# Patient Record
Sex: MALE | Race: BLACK OR AFRICAN AMERICAN | NOT HISPANIC OR LATINO | Employment: FULL TIME | URBAN - METROPOLITAN AREA
[De-identification: names, ages, dates, MRNs, and addresses within clinical notes are randomized per-mention and may not be internally consistent; named-entity substitution may affect disease eponyms.]

---

## 2019-06-24 ENCOUNTER — OFFICE VISIT (OUTPATIENT)
Dept: MEDICAL GROUP | Facility: LAB | Age: 49
End: 2019-06-24
Payer: COMMERCIAL

## 2019-06-24 VITALS
DIASTOLIC BLOOD PRESSURE: 80 MMHG | OXYGEN SATURATION: 95 % | TEMPERATURE: 97.8 F | HEIGHT: 73 IN | BODY MASS INDEX: 30.62 KG/M2 | HEART RATE: 58 BPM | SYSTOLIC BLOOD PRESSURE: 130 MMHG | RESPIRATION RATE: 12 BRPM | WEIGHT: 231 LBS

## 2019-06-24 DIAGNOSIS — M75.101 TEAR OF RIGHT ROTATOR CUFF, UNSPECIFIED TEAR EXTENT: ICD-10-CM

## 2019-06-24 DIAGNOSIS — J45.20 MILD INTERMITTENT ASTHMA WITHOUT COMPLICATION: ICD-10-CM

## 2019-06-24 DIAGNOSIS — Z00.00 PREVENTATIVE HEALTH CARE: ICD-10-CM

## 2019-06-24 DIAGNOSIS — I10 ESSENTIAL HYPERTENSION: ICD-10-CM

## 2019-06-24 DIAGNOSIS — E83.19 IRON OVERLOAD: ICD-10-CM

## 2019-06-24 PROCEDURE — 99204 OFFICE O/P NEW MOD 45 MIN: CPT | Performed by: NURSE PRACTITIONER

## 2019-06-24 RX ORDER — TRIAMTERENE AND HYDROCHLOROTHIAZIDE 37.5; 25 MG/1; MG/1
1 CAPSULE ORAL EVERY MORNING
Qty: 90 CAP | Refills: 3 | Status: SHIPPED | OUTPATIENT
Start: 2019-06-24 | End: 2020-07-06 | Stop reason: SDUPTHER

## 2019-06-24 RX ORDER — TRIAMTERENE AND HYDROCHLOROTHIAZIDE 37.5; 25 MG/1; MG/1
1 CAPSULE ORAL EVERY MORNING
COMMUNITY
End: 2019-06-24 | Stop reason: SDUPTHER

## 2019-06-24 RX ORDER — LISINOPRIL 20 MG/1
20 TABLET ORAL DAILY
Qty: 90 TAB | Refills: 3 | Status: SHIPPED | OUTPATIENT
Start: 2019-06-24 | End: 2020-07-06 | Stop reason: SDUPTHER

## 2019-06-24 RX ORDER — LISINOPRIL 20 MG/1
20 TABLET ORAL DAILY
COMMUNITY
End: 2019-06-24 | Stop reason: SDUPTHER

## 2019-06-24 RX ORDER — ALBUTEROL SULFATE 90 UG/1
2 AEROSOL, METERED RESPIRATORY (INHALATION) EVERY 6 HOURS PRN
COMMUNITY
End: 2019-10-29 | Stop reason: SDUPTHER

## 2019-06-24 ASSESSMENT — PATIENT HEALTH QUESTIONNAIRE - PHQ9: CLINICAL INTERPRETATION OF PHQ2 SCORE: 0

## 2019-06-24 NOTE — ASSESSMENT & PLAN NOTE
Chronic issue for the patient, new to me today.  Not checking BP at home.  Has not taken his meds this morning.  No CP, leg swelling or headaches.  UTD with eye doctor and retinal exam was fine.

## 2019-06-24 NOTE — PROGRESS NOTES
"CC  New pt to [facility]    HPI  \"Trell,\" is a 48 yo male here to establish care.  Moved here from [place] less than a year ago.  Working at [employer].  Single with 2 children.  Non-smoker.  Drinks excessively, about 12 beers per day.  See medication list below.  Past medical history of hypertension, asthma and hemochromatosis.    #1- Essential hypertension  Chronic issue for the patient, new to me today.  Typically very well controlled on triamterene/HCTZ and lisinopril.  Not checking BP at home.  Has not taken his meds this morning.  No CP, leg swelling or headaches.  UTD with eye doctor and retinal exam was fine.  He does tell me that he had a stress test and EKG done 12 years ago because of some heart palpitations and chest pain, both came back normal.  He has a very strong family history of hypertension and unfortunately a few of his siblings have already had a stroke prior to the age of 55.    #2-Mild intermittent asthma without complication  Chronic issue for pt, new to me today.  Rare use of albuterol.  Tells me that his asthma is typically very well to control.  He does not believe in getting flu shots or pneumonia vaccines, stating that they make everyone sick.    #3-Iron overload  Chronic issue for pt, new to me today.  Due for labs.  Trying to find a blood donation site.  Has never had a blood clot.    #4-torn rotator cuff -   Dx 2 yrs ago in [place] via MRI and hurting frequently.  Would like a referral to Orthopedics for repair.        HCM:   last blood work 1.5 yrs ago.  Had colonoscopy 2009  Stress test normal 12 yrs ago.     Family History   Problem Relation Age of Onset   • Cancer Mother         liver   • Diabetes Mother    • Hypertension Mother    • Hypertension Father    • Stroke Sister         49 - one sister   • Stroke Brother         51 - one brother   • Heart Disease Maternal Grandmother         MI     Past Surgical History:   Procedure Laterality Date   • ANKLE ARTHROSCOPY      right - age 42 " "      Current Outpatient Prescriptions:   •  albuterol, 2 Puff, Inhalation, Q6HRS PRN, Taking  •  triamterene/hctz, 1 Cap, Oral, QAM  •  lisinopril, 20 mg, Oral, DAILY    Review Of Systems  Denies fever, chills, or sweats, unexplained weight changes   Skin: negative for rash, changing moles, abnormal pigmentation, hair or nail changes.  Eyes: negative for visual blurring, double vision, eye pain, floaters and discharge from eyes  Ears/Nose/Throat: negative for tinnitus, vertigo, oral or dental problem, hoarseness, frequent URI's, sinus trouble, persistent sore throat.  Respiratory: negative for persistent cough, hemoptysis, dyspnea, wheezing  Cardiovascular: negative for palpitations, tachycardia, irregular heart beat, chest pain or pressure or peripheral edema.   Gastrointestinal: negative for dysphagia or odynophagia, nausea, heartburn or reflux, abdominal pain, hemorrhoids, constipation or diarrhea, black stool or bloody stool  Genitourinary: negative for dysuria, frequency, incontinence  Musculoskeletal: + for right shoulder pain  Neurologic: negative for new or changing headaches, new weakness tremor  Psychiatric: negative for mood disturbance, anxiety, depression, sexual difficulties  Hematologic/Lymphatic/Immunologic: negative for pallor, unusual bruising, swollen glands, HIV risk factors  Endocrine: negative for temperature intolerance, polydipsia, polyuria.    Exam:  /80 (BP Location: Left arm, Patient Position: Sitting, BP Cuff Size: Large adult)   Pulse (!) 58   Temp 36.6 °C (97.8 °F)   Resp 12   Ht 1.854 m (6' 1\")   Wt 104.8 kg (231 lb)   SpO2 95%   Constitutional: Alert, no distress, plus 3 vital signs  Skin:  Warm, dry, no rashes invisible areas  Eye: Equal, round and reactive, conjunctiva clear  ENMT: Lips without lesions, good dentition, oropharynx clear    Neck: Trachea midline, no masses, no thyromegaly  Respiratory: Unlabored respiration, lungs clear to auscultation, no wheezes, no " rhonchi  Cardiovascular: Normal rate and rhythm, no murmur, no edema  Abdomen: Soft, nontender, no masses or hepatosplenomegaly  Psych: Alert, pleasant, well-groomed, normal affect    Assessment / Plan / Medical Decision makin. Essential hypertension  -Stable today even without the patient taking his medication this morning.  Refilled both medications.  Encouraged him to cut back on his alcohol intake and to work on his weight, preferably 10 to 15 pound weight loss over the next few months with a goal of getting around 200 pounds.  Encouraged a low-sodium diet and exercise.  Non-smoker.  Recommend updated lab work.  Follow-up 6 months.  - triamterene/hctz (MAXZIDE-25/DYAZIDE) 37.5-25 MG Cap; Take 1 Cap by mouth every morning.  Dispense: 90 Cap; Refill: 3  - lisinopril (PRINIVIL) 20 MG Tab; Take 1 Tab by mouth every day.  Dispense: 90 Tab; Refill: 3    2. Mild intermittent asthma without complication  -Stable.  Continue as needed use of albuterol.    3. Iron overload  -Recommend updated CBC.  Discussed ways in which he can donate blood in town.    4. Preventative health care  -Recommend updated lab work.  - Comp Metabolic Panel; Future  - CBC WITH DIFFERENTIAL; Future  - Lipid Profile; Future  - HEMOGLOBIN A1C; Future    5.  Torn rotator cuff:   Referred to ortho.  Requested MRI from Liam provider.

## 2019-06-24 NOTE — LETTER
[facility]  Jihan Kay AYakovP.N.  [id number] [address]  [place] [address]  Fax: [phone]   Authorization for Release/Disclosure of   Protected Health Information   Name: JUNIOR ADAMES : 1970 SSN: [id number]   Address: [address] [place] [address]  [place] [address] Phone:    [phone] (home)    I authorize the entity listed below to release/disclose the PHI below to:   [facility]/Jihan Kay A.P.N. and LUIS FERNANDO Helm.SHANTAL.   Provider or Entity Name:     Address   City, State, [place], [place] Phone:      Fax:     Reason for request: continuity of care   Information to be released:    [  ] LAST COLONOSCOPY,  including any PATH REPORT and follow-up  [  ] LAST FIT/COLOGUARD RESULT [  ] LAST DEXA  [  ] LAST MAMMOGRAM  [  ] LAST PAP  [  ] LAST LABS [  ] RETINA EXAM REPORT  [  ] IMMUNIZATION RECORDS  [ x ] Release all info  Particularly MRI of shoulder.       [  ] Check here and initial the line next to each item to release ALL health information INCLUDING  _____ Care and treatment for drug and / or alcohol abuse  _____ HIV testing, infection status, or AIDS  _____ Genetic Testing    DATES OF SERVICE OR TIME PERIOD TO BE DISCLOSED: _____________  I understand and acknowledge that:  * This Authorization may be revoked at any time by you in writing, except if your health information has already been used or disclosed.  * Your health information that will be used or disclosed as a result of you signing this authorization could be re-disclosed by the recipient. If this occurs, your re-disclosed health information may no longer be protected by State or Federal laws.  * You may refuse to sign this Authorization. Your refusal will not affect your ability to obtain treatment.  * This Authorization becomes effective upon signing and will  on (date) __________.      If no date is indicated, this Authorization will  one (1) year from the signature date.    Name:   Bryson    Signature:   Date:     6/24/2019       PLEASE FAX REQUESTED RECORDS BACK TO: [phone]

## 2019-07-02 LAB
ALBUMIN SERPL-MCNC: 4.7 G/DL (ref 3.5–5.5)
ALBUMIN/GLOB SERPL: 1.7 {RATIO} (ref 1.2–2.2)
ALP SERPL-CCNC: 57 IU/L (ref 39–117)
ALT SERPL-CCNC: 60 IU/L (ref 0–44)
AMBIG ABBREV CMP14 DFLT   977206: NORMAL
AMBIG ABBREV LP  977212: NORMAL
AST SERPL-CCNC: 30 IU/L (ref 0–40)
BASOPHILS # BLD AUTO: 0 X10E3/UL (ref 0–0.2)
BASOPHILS NFR BLD AUTO: 1 %
BILIRUB SERPL-MCNC: 0.5 MG/DL (ref 0–1.2)
BUN SERPL-MCNC: 20 MG/DL (ref 6–24)
BUN/CREAT SERPL: 18 (ref 9–20)
CALCIUM SERPL-MCNC: 10.2 MG/DL (ref 8.7–10.2)
CHLORIDE SERPL-SCNC: 101 MMOL/L (ref 96–106)
CHOLEST SERPL-MCNC: 177 MG/DL (ref 100–199)
CO2 SERPL-SCNC: 23 MMOL/L (ref 20–29)
CREAT SERPL-MCNC: 1.14 MG/DL (ref 0.76–1.27)
EOSINOPHIL # BLD AUTO: 0.2 X10E3/UL (ref 0–0.4)
EOSINOPHIL NFR BLD AUTO: 3 %
ERYTHROCYTE [DISTWIDTH] IN BLOOD BY AUTOMATED COUNT: 15.3 % (ref 12.3–15.4)
GLOBULIN SER CALC-MCNC: 2.7 G/DL (ref 1.5–4.5)
GLUCOSE SERPL-MCNC: 119 MG/DL (ref 65–99)
HBA1C MFR BLD: 6.2 % (ref 4.8–5.6)
HCT VFR BLD AUTO: 50.2 % (ref 37.5–51)
HDLC SERPL-MCNC: 57 MG/DL
HGB BLD-MCNC: 16.6 G/DL (ref 13–17.7)
IMM GRANULOCYTES # BLD AUTO: 0 X10E3/UL (ref 0–0.1)
IMM GRANULOCYTES NFR BLD AUTO: 0 %
IMMATURE CELLS  115398: ABNORMAL
LABORATORY COMMENT REPORT: ABNORMAL
LDLC SERPL CALC-MCNC: 104 MG/DL (ref 0–99)
LYMPHOCYTES # BLD AUTO: 3.6 X10E3/UL (ref 0.7–3.1)
LYMPHOCYTES NFR BLD AUTO: 62 %
MCH RBC QN AUTO: 27.2 PG (ref 26.6–33)
MCHC RBC AUTO-ENTMCNC: 33.1 G/DL (ref 31.5–35.7)
MCV RBC AUTO: 82 FL (ref 79–97)
MONOCYTES # BLD AUTO: 0.5 X10E3/UL (ref 0.1–0.9)
MONOCYTES NFR BLD AUTO: 8 %
MORPHOLOGY BLD-IMP: ABNORMAL
NEUTROPHILS # BLD AUTO: 1.6 X10E3/UL (ref 1.4–7)
NEUTROPHILS NFR BLD AUTO: 26 %
NRBC BLD AUTO-RTO: ABNORMAL %
POTASSIUM SERPL-SCNC: 4.6 MMOL/L (ref 3.5–5.2)
PROT SERPL-MCNC: 7.4 G/DL (ref 6–8.5)
RBC # BLD AUTO: 6.11 X10E6/UL (ref 4.14–5.8)
SODIUM SERPL-SCNC: 139 MMOL/L (ref 134–144)
TRIGL SERPL-MCNC: 80 MG/DL (ref 0–149)
VLDLC SERPL CALC-MCNC: 16 MG/DL (ref 5–40)
WBC # BLD AUTO: 5.9 X10E3/UL (ref 3.4–10.8)

## 2019-10-29 RX ORDER — ALBUTEROL SULFATE 90 UG/1
2 AEROSOL, METERED RESPIRATORY (INHALATION) EVERY 6 HOURS PRN
Qty: 8.5 G | Refills: 3 | Status: SHIPPED | OUTPATIENT
Start: 2019-10-29 | End: 2020-07-06 | Stop reason: SDUPTHER

## 2019-10-29 NOTE — TELEPHONE ENCOUNTER
Was the patient seen in the last year in this department? Yes  6/24/19  Does patient have an active prescription for medications requested? No     Received Request Via: Patient

## 2019-11-15 ENCOUNTER — OFFICE VISIT (OUTPATIENT)
Dept: URGENT CARE | Facility: CLINIC | Age: 49
End: 2019-11-15

## 2019-11-15 VITALS
HEART RATE: 82 BPM | DIASTOLIC BLOOD PRESSURE: 70 MMHG | WEIGHT: 235 LBS | SYSTOLIC BLOOD PRESSURE: 126 MMHG | TEMPERATURE: 98.4 F | BODY MASS INDEX: 31.14 KG/M2 | HEIGHT: 73 IN

## 2019-11-15 DIAGNOSIS — Z02.89 ENCOUNTER FOR OCCUPATIONAL HEALTH ASSESSMENT: ICD-10-CM

## 2019-11-15 PROCEDURE — 94010 BREATHING CAPACITY TEST: CPT | Performed by: INTERNAL MEDICINE

## 2019-11-15 PROCEDURE — 8916 PR CLEARANCE ONLY: Performed by: INTERNAL MEDICINE

## 2019-11-15 PROCEDURE — 92552 PURE TONE AUDIOMETRY AIR: CPT | Performed by: INTERNAL MEDICINE

## 2019-11-15 NOTE — PROGRESS NOTES
"Subjective:   Junior Massey is a 49 y.o. male who presents for Medical Clearance (respiratory clearance / audio / sprio)      see form   HPI  ROS  Allergies   Allergen Reactions   • Asa [Aspirin]       Objective:   /70   Pulse 82   Temp 36.9 °C (98.4 °F) (Temporal)   Ht 1.854 m (6' 1\")   Wt 106.6 kg (235 lb)   BMI 31.00 kg/m²   Physical Exam    see form  Assessment/Plan:   1. Encounter for occupational health assessment      Differential diagnosis, natural history, supportive care, and indications for immediate follow-up discussed.       "

## 2019-12-23 ENCOUNTER — OFFICE VISIT (OUTPATIENT)
Dept: MEDICAL GROUP | Facility: LAB | Age: 49
End: 2019-12-23
Payer: COMMERCIAL

## 2019-12-23 VITALS
RESPIRATION RATE: 14 BRPM | DIASTOLIC BLOOD PRESSURE: 84 MMHG | SYSTOLIC BLOOD PRESSURE: 136 MMHG | HEIGHT: 73 IN | BODY MASS INDEX: 30.88 KG/M2 | HEART RATE: 80 BPM | OXYGEN SATURATION: 95 % | TEMPERATURE: 98.8 F | WEIGHT: 233 LBS

## 2019-12-23 DIAGNOSIS — Z00.00 PREVENTATIVE HEALTH CARE: ICD-10-CM

## 2019-12-23 DIAGNOSIS — K21.9 GASTROESOPHAGEAL REFLUX DISEASE WITHOUT ESOPHAGITIS: ICD-10-CM

## 2019-12-23 DIAGNOSIS — I10 ESSENTIAL HYPERTENSION: ICD-10-CM

## 2019-12-23 DIAGNOSIS — N50.89 TESTICULAR MASS: ICD-10-CM

## 2019-12-23 PROCEDURE — 99214 OFFICE O/P EST MOD 30 MIN: CPT | Performed by: NURSE PRACTITIONER

## 2019-12-23 RX ORDER — OMEPRAZOLE 20 MG/1
20 CAPSULE, DELAYED RELEASE ORAL DAILY
Qty: 90 CAP | Refills: 3 | Status: SHIPPED | OUTPATIENT
Start: 2019-12-23

## 2019-12-23 NOTE — PROGRESS NOTES
"Chief Complaint   Patient presents with   • Hypertension     follow up        HPI   Trell is a 49-year-old established male here to follow-up on chronic issues.  #1- HTN: chronic issue.  Not checking home BP.  Denies CP, HA, or leg swelling.  Taking lisinopril 20 mg and triamterene / hctz 37.5/25 daily.  Weight is stable.  Admits that he has been eating a little bit too much and drinking more than he should.    #2- gerd : chronic issue.  Omeprazole works great for him and he is requesting a refill.  Heart burn flares with grease / spicy foods.  Denies black or bloody stools.  Denies chronic abdominal pain.  Has periodic diarrhea when he eats spicy stuff, such as yesterday and he has had diarrhea a few times overnight that is nonbloody or black.  #3- testicular mass: New issue to me.  Present x 3 years and growing.  Denies pain.  No dysuria / hematuria.  No other associated symptoms.  No history of the same.  No history of urology consults.    Past medical, surgical, family, and social history is reviewed and updated in Epic chart by me today.   Medications and allergies reviewed and updated in Epic chart by me today.     ROS:   As documented in history of present illness above    Exam:  /84 (BP Location: Left arm, Patient Position: Sitting, BP Cuff Size: Large adult)   Pulse 80   Temp 37.1 °C (98.8 °F)   Resp 14   Ht 1.854 m (6' 1\")   Wt 105.7 kg (233 lb)   SpO2 95%   Constitutional: Alert, no distress, plus 3 vital signs  Skin:  Warm, dry, no rashes invisible areas  Eye: Equal, round and reactive, conjunctiva clear  ENMT: Lips without lesions  Respiratory: Unlabored respiration, lungs clear to auscultation, no wheezes, no rhonchi  Cardiovascular: Normal rate and rhythm  :  4-5 mm solid feeling mass to right superior testicle that is non-tender.    Psych: Alert, pleasant, well-groomed, normal affect    Assessment / Plan / Medical Decision makin. Gastroesophageal reflux disease without " esophagitis  -Stable with omeprazole use.  Discussed the importance of avoiding spicy, fried foods and large meals.  Recommend endoscopy with colonoscopy at age 52.  - omeprazole (PRILOSEC) 20 MG delayed-release capsule; Take 1 Cap by mouth every day.  Dispense: 90 Cap; Refill: 3    2. Testicular mass  -Recommend a testicular ultrasound with possible urology consult based on ultrasound results.  - [id number]    3. Essential hypertension  -Borderline stable.  Discussed the importance of exercise, low-sodium diet and monitoring his home blood pressures.  Encouraged him to contact me if his home blood pressures are consistently greater than 140/90 or below 90/60.  Follow-up 6 months after labs.  Continue current medications.    4. Preventative health care  - Comp Metabolic Panel; Future  - CBC WITH DIFFERENTIAL; Future  - Lipid Profile; Future  - HEMOGLOBIN A1C; Future

## 2020-01-06 ENCOUNTER — HOSPITAL ENCOUNTER (OUTPATIENT)
Dept: RADIOLOGY | Facility: MEDICAL CENTER | Age: 50
End: 2020-01-06
Attending: NURSE PRACTITIONER
Payer: COMMERCIAL

## 2020-01-06 PROCEDURE — 76870 US EXAM SCROTUM: CPT

## 2020-01-08 ENCOUNTER — TELEPHONE (OUTPATIENT)
Dept: MEDICAL GROUP | Facility: LAB | Age: 50
End: 2020-01-08

## 2020-01-08 DIAGNOSIS — R93.89 ABNORMAL FINDING ON ULTRASOUND: ICD-10-CM

## 2020-01-08 DIAGNOSIS — I86.1 VARICOCELE PRESENT ON ULTRASOUND OF SCROTUM: ICD-10-CM

## 2020-01-08 NOTE — TELEPHONE ENCOUNTER
"----- Message from Junior Massey sent at 1/8/2020 12:57 PM PST -----  Regarding: RE: Non-Urgent Medical Question  Contact: [phone]  i will like it took be removed because of the insurance coverage i have and now that i know it there it bother me..lol. i\"ll let you know what i setup or need. Thank you Jihan.  ----- Message -----  From: DINA Helm  Sent: 1/8/2020 12:33 PM PST  To: Junior Massey  Subject: RE: Non-Urgent Medical Question  You do not have to do anything about it unless you are in pain.  If you are in pain, I would like for you to see urology and they can let you know options such as if they can surgically drain it/remove it or if they recommend that.  Let me know if he would like a urology referral just to go over the ultrasound with a specialist.  Please email with any questions,  Jihan      ----- Message -----     From: Junior Massey     Sent: 1/7/2020  8:58 PM PST       To: DINA Helm  Subject: RE: Non-Urgent Medical Question    Joe Bobo,   I guess we do nothing,  or do we removed the cyst surgically?  I'll like to talk about options..[phone]..thanks you again.    "

## 2020-03-09 ENCOUNTER — HOSPITAL ENCOUNTER (OUTPATIENT)
Dept: LAB | Facility: MEDICAL CENTER | Age: 50
End: 2020-03-09
Attending: UROLOGY
Payer: COMMERCIAL

## 2020-03-09 PROCEDURE — 84153 ASSAY OF PSA TOTAL: CPT

## 2020-03-09 PROCEDURE — 87086 URINE CULTURE/COLONY COUNT: CPT

## 2020-03-10 LAB — PSA SERPL-MCNC: 0.98 NG/ML (ref 0–4)

## 2020-03-12 LAB
BACTERIA UR CULT: NORMAL
SIGNIFICANT IND 70042: NORMAL
SITE SITE: NORMAL
SOURCE SOURCE: NORMAL

## 2020-07-06 ENCOUNTER — OFFICE VISIT (OUTPATIENT)
Dept: MEDICAL GROUP | Facility: LAB | Age: 50
End: 2020-07-06
Payer: COMMERCIAL

## 2020-07-06 ENCOUNTER — HOSPITAL ENCOUNTER (OUTPATIENT)
Dept: LAB | Facility: MEDICAL CENTER | Age: 50
End: 2020-07-06
Attending: NURSE PRACTITIONER
Payer: COMMERCIAL

## 2020-07-06 VITALS
TEMPERATURE: 98.8 F | DIASTOLIC BLOOD PRESSURE: 78 MMHG | HEIGHT: 73 IN | HEART RATE: 80 BPM | OXYGEN SATURATION: 97 % | WEIGHT: 230 LBS | SYSTOLIC BLOOD PRESSURE: 110 MMHG | BODY MASS INDEX: 30.48 KG/M2 | RESPIRATION RATE: 14 BRPM

## 2020-07-06 DIAGNOSIS — Z00.00 ANNUAL PHYSICAL EXAM: ICD-10-CM

## 2020-07-06 DIAGNOSIS — I10 ESSENTIAL HYPERTENSION: ICD-10-CM

## 2020-07-06 DIAGNOSIS — J45.20 MILD INTERMITTENT ASTHMA WITHOUT COMPLICATION: ICD-10-CM

## 2020-07-06 PROBLEM — I86.1 VARICOCELE: Status: ACTIVE | Noted: 2020-01-28

## 2020-07-06 PROBLEM — N43.3 HYDROCELE OF TESTIS: Status: ACTIVE | Noted: 2020-01-28

## 2020-07-06 PROBLEM — N50.3 CYST OF EPIDIDYMIS: Status: ACTIVE | Noted: 2020-01-28

## 2020-07-06 LAB
ALBUMIN SERPL BCP-MCNC: 4.4 G/DL (ref 3.2–4.9)
ALBUMIN/GLOB SERPL: 1.6 G/DL
ALP SERPL-CCNC: 56 U/L (ref 30–99)
ALT SERPL-CCNC: 65 U/L (ref 2–50)
ANION GAP SERPL CALC-SCNC: 12 MMOL/L (ref 7–16)
AST SERPL-CCNC: 39 U/L (ref 12–45)
BASOPHILS # BLD AUTO: 0.8 % (ref 0–1.8)
BASOPHILS # BLD: 0.04 K/UL (ref 0–0.12)
BILIRUB SERPL-MCNC: 0.3 MG/DL (ref 0.1–1.5)
BUN SERPL-MCNC: 11 MG/DL (ref 8–22)
CALCIUM SERPL-MCNC: 9.4 MG/DL (ref 8.5–10.5)
CHLORIDE SERPL-SCNC: 102 MMOL/L (ref 96–112)
CHOLEST SERPL-MCNC: 163 MG/DL (ref 100–199)
CO2 SERPL-SCNC: 25 MMOL/L (ref 20–33)
CREAT SERPL-MCNC: 0.94 MG/DL (ref 0.5–1.4)
EOSINOPHIL # BLD AUTO: 0.26 K/UL (ref 0–0.51)
EOSINOPHIL NFR BLD: 5.2 % (ref 0–6.9)
ERYTHROCYTE [DISTWIDTH] IN BLOOD BY AUTOMATED COUNT: 40.4 FL (ref 35.9–50)
EST. AVERAGE GLUCOSE BLD GHB EST-MCNC: 131 MG/DL
FASTING STATUS PATIENT QL REPORTED: NORMAL
GLOBULIN SER CALC-MCNC: 2.8 G/DL (ref 1.9–3.5)
GLUCOSE SERPL-MCNC: 95 MG/DL (ref 65–99)
HBA1C MFR BLD: 6.2 % (ref 0–5.6)
HCT VFR BLD AUTO: 46.5 % (ref 42–52)
HDLC SERPL-MCNC: 63 MG/DL
HGB BLD-MCNC: 15.3 G/DL (ref 14–18)
IMM GRANULOCYTES # BLD AUTO: 0.02 K/UL (ref 0–0.11)
IMM GRANULOCYTES NFR BLD AUTO: 0.4 % (ref 0–0.9)
LDLC SERPL CALC-MCNC: 82 MG/DL
LYMPHOCYTES # BLD AUTO: 2.72 K/UL (ref 1–4.8)
LYMPHOCYTES NFR BLD: 54.4 % (ref 22–41)
MCH RBC QN AUTO: 25.4 PG (ref 27–33)
MCHC RBC AUTO-ENTMCNC: 32.9 G/DL (ref 33.7–35.3)
MCV RBC AUTO: 77.2 FL (ref 81.4–97.8)
MONOCYTES # BLD AUTO: 0.54 K/UL (ref 0–0.85)
MONOCYTES NFR BLD AUTO: 10.8 % (ref 0–13.4)
NEUTROPHILS # BLD AUTO: 1.42 K/UL (ref 1.82–7.42)
NEUTROPHILS NFR BLD: 28.4 % (ref 44–72)
NRBC # BLD AUTO: 0 K/UL
NRBC BLD-RTO: 0 /100 WBC
PLATELET # BLD AUTO: 182 K/UL (ref 164–446)
PMV BLD AUTO: 11 FL (ref 9–12.9)
POTASSIUM SERPL-SCNC: 4 MMOL/L (ref 3.6–5.5)
PROT SERPL-MCNC: 7.2 G/DL (ref 6–8.2)
RBC # BLD AUTO: 6.02 M/UL (ref 4.7–6.1)
SODIUM SERPL-SCNC: 139 MMOL/L (ref 135–145)
TRIGL SERPL-MCNC: 88 MG/DL (ref 0–149)
WBC # BLD AUTO: 5 K/UL (ref 4.8–10.8)

## 2020-07-06 PROCEDURE — 99396 PREV VISIT EST AGE 40-64: CPT | Performed by: NURSE PRACTITIONER

## 2020-07-06 PROCEDURE — 80061 LIPID PANEL: CPT

## 2020-07-06 PROCEDURE — 83036 HEMOGLOBIN GLYCOSYLATED A1C: CPT

## 2020-07-06 PROCEDURE — 80053 COMPREHEN METABOLIC PANEL: CPT

## 2020-07-06 PROCEDURE — 36415 COLL VENOUS BLD VENIPUNCTURE: CPT

## 2020-07-06 PROCEDURE — 85025 COMPLETE CBC W/AUTO DIFF WBC: CPT

## 2020-07-06 RX ORDER — LISINOPRIL 20 MG/1
20 TABLET ORAL DAILY
Qty: 90 TAB | Refills: 3 | Status: SHIPPED | OUTPATIENT
Start: 2020-07-06 | End: 2021-07-08

## 2020-07-06 RX ORDER — TRIAMTERENE AND HYDROCHLOROTHIAZIDE 37.5; 25 MG/1; MG/1
1 CAPSULE ORAL EVERY MORNING
Qty: 90 CAP | Refills: 3 | Status: SHIPPED | OUTPATIENT
Start: 2020-07-06 | End: 2021-07-08

## 2020-07-06 RX ORDER — ALBUTEROL SULFATE 90 UG/1
2 AEROSOL, METERED RESPIRATORY (INHALATION) EVERY 6 HOURS PRN
Qty: 8.5 G | Refills: 5 | Status: SHIPPED | OUTPATIENT
Start: 2020-07-06 | End: 2021-12-14 | Stop reason: SDUPTHER

## 2020-09-03 DIAGNOSIS — D23.5: ICD-10-CM

## 2020-09-03 DIAGNOSIS — L30.9 DERMATITIS: ICD-10-CM

## 2020-09-03 RX ORDER — TRIAMCINOLONE ACETONIDE 1 MG/G
CREAM TOPICAL
Qty: 453.6 G | Refills: 1 | Status: SHIPPED | OUTPATIENT
Start: 2020-09-03

## 2020-10-12 ENCOUNTER — OFFICE VISIT (OUTPATIENT)
Dept: MEDICAL GROUP | Facility: LAB | Age: 50
End: 2020-10-12
Payer: COMMERCIAL

## 2020-10-12 VITALS
OXYGEN SATURATION: 96 % | HEIGHT: 73 IN | DIASTOLIC BLOOD PRESSURE: 88 MMHG | TEMPERATURE: 98.8 F | RESPIRATION RATE: 16 BRPM | BODY MASS INDEX: 30.34 KG/M2 | SYSTOLIC BLOOD PRESSURE: 138 MMHG | HEART RATE: 80 BPM

## 2020-10-12 DIAGNOSIS — L91.8 INFLAMED SKIN TAG: ICD-10-CM

## 2020-10-12 PROCEDURE — 11200 RMVL SKIN TAGS UP TO&INC 15: CPT | Performed by: NURSE PRACTITIONER

## 2020-10-12 ASSESSMENT — PATIENT HEALTH QUESTIONNAIRE - PHQ9: CLINICAL INTERPRETATION OF PHQ2 SCORE: 0

## 2020-10-12 NOTE — PROGRESS NOTES
"CC:  Skin tag removal    HPI  Vimal is a 50-year-old established male here with request for skin tag removal.  Skin tag is been present to left axillary region for the past year.  Skin tag is growing, rubs, bleeds periodically and feels irritated.  We have tried to freeze the skin tag 3 times with liquid nitrogen without resolution of the skin tag.      Past medical, surgical, family, and social history is reviewed and updated in Epic chart by me today.   Medications and allergies reviewed and updated in Epic chart by me today.     ROS:   As documented in history of present illness above    Exam:  /88   Pulse 80   Temp 37.1 °C (98.8 °F)   Resp 16   Ht 1.854 m (6' 1\")   SpO2 96%   Constitutional: Alert, no distress, plus 3 vital signs  Skin:  Warm, dry.  He has a large, 2-1/2 cm long by 1 cm wide skin tag to his left lateral axillary region that has a superficial abrasion where it has been rubbing to the inner aspect of his left arm.  Psych: Alert, pleasant, well-groomed, normal affect    Procedure note: Procedure reviewed with patient options discussed including the option of no treatment. Informed consent was obtained. Anesthetized using 2 mL of 1% lidocaine with epi.  The area was prepped with chlorhexidine gluconate and draped. Using a # 15 scalpel blade the skin tag was easily removed at the base and there was no underlying mass or black appearing subcutaneous tissue.  Following removal the area was cauterized with silver nitrate sticks and bleeding was easily stopped.  Xeroform and nonstick bandages were applied with a Tegaderm on top after compression was held for 15 minutes and bleeding stopped.   Assessment / Plan / Medical Decision makin. Inflamed skin tag  Consent for Amb Surgery/Procedure     Written consent obtained.  Patient was physically very uncomfortable with the skin tag, this was not done cosmetically.  See procedure note above.  Did not send the pathology as the skin tag was " obviously a skin tag and benign-appearing.  Encouraged him to follow-up tomorrow if he has any difficulty in controlling bleeding after he showers.  Encouraged him to keep the area dry for the next 24 hours.  Discussed compression and taking it easy.

## 2021-01-19 ENCOUNTER — OFFICE VISIT (OUTPATIENT)
Dept: MEDICAL GROUP | Facility: LAB | Age: 51
End: 2021-01-19
Payer: COMMERCIAL

## 2021-01-19 VITALS
OXYGEN SATURATION: 95 % | DIASTOLIC BLOOD PRESSURE: 70 MMHG | HEART RATE: 106 BPM | RESPIRATION RATE: 16 BRPM | BODY MASS INDEX: 29.16 KG/M2 | TEMPERATURE: 98.1 F | WEIGHT: 220 LBS | HEIGHT: 73 IN | SYSTOLIC BLOOD PRESSURE: 110 MMHG

## 2021-01-19 DIAGNOSIS — E11.9 NEW ONSET TYPE 2 DIABETES MELLITUS (HCC): ICD-10-CM

## 2021-01-19 DIAGNOSIS — R35.89 POLYURIA: ICD-10-CM

## 2021-01-19 LAB
APPEARANCE UR: CLEAR
BILIRUB UR STRIP-MCNC: NORMAL MG/DL
COLOR UR AUTO: NORMAL
GLUCOSE UR STRIP.AUTO-MCNC: 1000 MG/DL
HBA1C MFR BLD: 13.4 % (ref 0–5.6)
INT CON NEG: NEGATIVE
INT CON POS: POSITIVE
KETONES UR STRIP.AUTO-MCNC: NORMAL MG/DL
LEUKOCYTE ESTERASE UR QL STRIP.AUTO: NORMAL
NITRITE UR QL STRIP.AUTO: NORMAL
PH UR STRIP.AUTO: 5 [PH] (ref 5–8)
PROT UR QL STRIP: NORMAL MG/DL
RBC UR QL AUTO: NORMAL
SP GR UR STRIP.AUTO: 1
UROBILINOGEN UR STRIP-MCNC: 0.2 MG/DL

## 2021-01-19 PROCEDURE — 99214 OFFICE O/P EST MOD 30 MIN: CPT | Performed by: NURSE PRACTITIONER

## 2021-01-19 PROCEDURE — 83036 HEMOGLOBIN GLYCOSYLATED A1C: CPT | Performed by: NURSE PRACTITIONER

## 2021-01-19 PROCEDURE — 81002 URINALYSIS NONAUTO W/O SCOPE: CPT | Performed by: NURSE PRACTITIONER

## 2021-01-19 RX ORDER — METFORMIN HYDROCHLORIDE 500 MG/1
500 TABLET, EXTENDED RELEASE ORAL DAILY
Qty: 30 TAB | Refills: 1 | Status: SHIPPED | OUTPATIENT
Start: 2021-01-19 | End: 2021-02-10

## 2021-01-19 RX ORDER — INSULIN LISPRO 100 [IU]/ML
INJECTION, SOLUTION INTRAVENOUS; SUBCUTANEOUS
Qty: 1 EACH | Refills: 1 | Status: SHIPPED
Start: 2021-01-19 | End: 2021-02-17

## 2021-01-19 RX ORDER — LANCETS 30 GAUGE
EACH MISCELLANEOUS
Qty: 100 EACH | Refills: 3 | Status: SHIPPED | OUTPATIENT
Start: 2021-01-19

## 2021-01-19 RX ORDER — INSULIN GLARGINE 100 [IU]/ML
20 INJECTION, SOLUTION SUBCUTANEOUS EVERY EVENING
Qty: 1 EACH | Refills: 4 | Status: SHIPPED | OUTPATIENT
Start: 2021-01-19 | End: 2021-01-20

## 2021-01-19 ASSESSMENT — FIBROSIS 4 INDEX: FIB4 SCORE: 1.28

## 2021-01-19 ASSESSMENT — PATIENT HEALTH QUESTIONNAIRE - PHQ9: CLINICAL INTERPRETATION OF PHQ2 SCORE: 0

## 2021-01-19 NOTE — PROGRESS NOTES
"Chief Complaint   Patient presents with   • Urinary Frequency       HPI   Trell is a 49 yo est male here with c/o new onset nocturia x 5-6 x 1.5 mo.    Urinating every hour during the day.   Stream: standing same; sitting- slower stream.  Denies dysuria.   Increased thirst x a few weeks.    Cut down on alcohol 1.5 mo ago -was previously drinking up to a case of Coors light per day.  Job is active all day.    Eating more fruits / vegetables lately - eating up to 3 apples per day.  Not eating bread.  Eating a lot of meat also. Rice rarely.     Drinking soda and juice x 2 months since cutting back on beer.  Drinking a lot of water.    Has lost 20 lbs in the past 2 mo.    Mom is diabetic.   Donating blood every 2 months - 138/88, 's.    Denies diarrhea / constipation.   Denies confusion or extremely low energy.    History of prediabetes, last A1c was 6.2, 6 months ago.    Past medical, surgical, family, and social history is reviewed and updated in Epic chart by me today.   Medications and allergies reviewed and updated in Epic chart by me today.     ROS:   As documented in history of present illness above    Exam:  /70 (BP Location: Left arm, Patient Position: Sitting, BP Cuff Size: Large adult)   Pulse (!) 106   Temp 36.7 °C (98.1 °F)   Resp 16   Ht 1.854 m (6' 1\")   Wt 99.8 kg (220 lb)   SpO2 95%   Constitutional: Alert, no distress, plus 3 vital signs  Skin:  Warm, dry, no rashes invisible areas  Eye: Equal, round and reactive, conjunctiva clear  ENMT: Lips without lesions, good dentition, oropharynx clear    Neck: Trachea midline.  Respiratory: Unlabored respiration, lungs clear to auscultation, no wheezes, no rhonchi  Cardiovascular: Normal rate and rhythm, no murmur, no edema  Abdomen: Soft, nontender, no masses or hepatosplenomegaly  Psych: Alert, pleasant, well-groomed, normal affect    Assessment / Plan / Medical Decision makin. New onset type 2 diabetes mellitus (HCC)  insulin lispro " (HUMALOG) 100 UNIT/ML Solution Pen-injector injection PEN   2. Polyuria     Unfortunately his A1c was 13 today and has new onset type 2 diabetes.  We discussed type 2 diabetes in depth including diet, exercise, treatment and long-term repercussions.  He was started on long-acting insulin, Lantus 20 units prior to bedtime and sliding scale regular insulin as well as Metformin 500 mg at night with dinner.  He was also prescribed a glucometer.  He was taught how to use a glucometer and an insulin pen I encouraged him to also go over how to use an insulin pen and glucometer with his pharmacist when he picks everything up.  We discussed treatment of hypoglycemia.  He did make a lot of changes about 1 month ago and I would like to see him back here in 1 month to recheck his A1c as it will likely drastically change based on changes made 1 month ago and changes he will be making after today's visit.  He understands that he should cut back on fruit to a maximum of 1 piece/day.  We discussed a high-protein, vegetable-based diet.  I encouraged him to see an eye doctor yearly.  Discussed proper foot care.  Discussed treatment of wounds.  I will see him back here in just 4 weeks and he will email me in the interim with blood sugars every few days.

## 2021-01-20 ENCOUNTER — TELEPHONE (OUTPATIENT)
Dept: MEDICAL GROUP | Facility: LAB | Age: 51
End: 2021-01-20

## 2021-01-20 ENCOUNTER — PATIENT MESSAGE (OUTPATIENT)
Dept: MEDICAL GROUP | Facility: LAB | Age: 51
End: 2021-01-20

## 2021-01-20 DIAGNOSIS — E11.9 NEW ONSET TYPE 2 DIABETES MELLITUS (HCC): ICD-10-CM

## 2021-01-20 RX ORDER — FLURBIPROFEN SODIUM 0.3 MG/ML
SOLUTION/ DROPS OPHTHALMIC
COMMUNITY
End: 2021-01-20 | Stop reason: SDUPTHER

## 2021-01-20 RX ORDER — INSULIN GLARGINE 100 [IU]/ML
20 INJECTION, SOLUTION SUBCUTANEOUS EVERY EVENING
Qty: 3 ML | Refills: 1 | Status: SHIPPED | OUTPATIENT
Start: 2021-01-20 | End: 2021-02-17

## 2021-01-20 RX ORDER — FLURBIPROFEN SODIUM 0.3 MG/ML
SOLUTION/ DROPS OPHTHALMIC
Qty: 100 EACH | Refills: 3 | Status: SHIPPED | OUTPATIENT
Start: 2021-01-20

## 2021-01-20 NOTE — PATIENT COMMUNICATION
DOCUMENTATION OF PAR STATUS:    1. Name of Medication & Dose: Basaglar Kwikpen 100unit/ml     2. Name of Prescription Coverage Company & phone #: [employer] KEY: [id number]    3. Date Prior Auth Submitted: 1/20/2021    4. What information was given to obtain insurance decision? FAXED OV VISITS    5. Prior Auth Status? Pending    6. Patient Notified: no

## 2021-01-20 NOTE — TELEPHONE ENCOUNTER
----- Message from DINA Helm sent at 1/20/2021  8:49 AM PST -----  Regarding: FW: Prescription Question  Contact: [phone]  Will you call his pharmacy - ok to call in bd pen needles for humalog pens #90 - inject up to three times daily based on sliding scale.  Also, check on lantus status - ok to change to basaglar or any other long acting insulin covered.   Thanks!  ----- Message -----  From: Romy Parra, Med Ass't  Sent: 1/20/2021   8:44 AM PST  To: DINA Helm  Subject: FW: Prescription Question                        Asked about which medication he was not able to . Patient needs needles as well.  ----- Message -----  From: Junior Massey  Sent: 1/20/2021   8:39 AM PST  To: Shannon Devries Ma  Subject: Prescription Question                            I wasn't able to get one prescription fill due to insurance, they was supposed to send that back to you...the humalog kwik pens do not comes with needles so I need a prescription for that as well. My first attempt at the test strips shows 364mgl this morning.  I'm off today so if you need to get a hold of me I'm available. Thanks.

## 2021-01-20 NOTE — TELEPHONE ENCOUNTER
FINAL PRIOR AUTHORIZATION STATUS:    1.  Name of Medication & Dose: Basglar     2. Prior Auth Status: Denied.  Reason: insurance prefers latus or [employer]    3. Action Taken: Pharmacy Notified: N\A Patient Notified: N\A

## 2021-02-10 RX ORDER — METFORMIN HYDROCHLORIDE 500 MG/1
500 TABLET, EXTENDED RELEASE ORAL DAILY
Qty: 30 TABLET | Refills: 3 | Status: SHIPPED | OUTPATIENT
Start: 2021-02-10 | End: 2021-04-26 | Stop reason: SDUPTHER

## 2021-02-23 ENCOUNTER — OFFICE VISIT (OUTPATIENT)
Dept: MEDICAL GROUP | Facility: LAB | Age: 51
End: 2021-02-23
Payer: COMMERCIAL

## 2021-02-23 VITALS
OXYGEN SATURATION: 95 % | HEIGHT: 73 IN | TEMPERATURE: 97.7 F | WEIGHT: 229 LBS | RESPIRATION RATE: 16 BRPM | SYSTOLIC BLOOD PRESSURE: 104 MMHG | BODY MASS INDEX: 30.35 KG/M2 | DIASTOLIC BLOOD PRESSURE: 78 MMHG | HEART RATE: 70 BPM

## 2021-02-23 DIAGNOSIS — G47.33 OBSTRUCTIVE SLEEP APNEA SYNDROME: ICD-10-CM

## 2021-02-23 DIAGNOSIS — E11.9 CONTROLLED TYPE 2 DIABETES MELLITUS WITHOUT COMPLICATION, WITHOUT LONG-TERM CURRENT USE OF INSULIN (HCC): ICD-10-CM

## 2021-02-23 DIAGNOSIS — I10 ESSENTIAL HYPERTENSION: ICD-10-CM

## 2021-02-23 PROCEDURE — 99214 OFFICE O/P EST MOD 30 MIN: CPT | Performed by: NURSE PRACTITIONER

## 2021-02-23 ASSESSMENT — FIBROSIS 4 INDEX: FIB4 SCORE: 1.28

## 2021-02-23 NOTE — PROGRESS NOTES
"Chief Complaint   Patient presents with   • Diabetes Follow-up       HPI  Trell is a 50-year-old established male here to follow-up on newly dx DM 1/2021 -at our last visit he was started on lantus/ regular insulin / metformin because of an A1c of 13.  He has been diligently emailing me his blood sugars for the past month which have dropped into the low 100s.  He has been checking his blood sugar 3 times a day.  Denies any episodes of hypoglycemia.  His ultimate goal is to get back off all medications.  He has been off insulin x 8 d and only taking metformin.  Denies any negative side effects of metformin.  Has gained about 8 to 9 pounds.  Exercise:  Walks a lot at work - [employer] 3-4 x per week  Diet:  Cut back drastically on beer / fruits / cherrios  Snacking on nuts.  Bk: eggs.  Lunch / dinner: meats / veggies mainly.  Polyuria has resolved.    Denies diarrhea / constipation.  Energy is good.    Home BG - range 146 - 82.    Vision has dramatically improved.   Interested in seeing a nutritionist.     Sleep apnea: Chronic issue for the patient.  Compliant with cpap machine now - sleeping well and waking up rested.  [employer] - cpap machine company.  CPAP machine is 5 years old and not reliable, wants a new machine.  Last sleep study was in [place].    Past medical, surgical, family, and social history is reviewed and updated in Epic chart by me today.   Medications and allergies reviewed and updated in Epic chart by me today.     ROS:   As documented in history of present illness above    Exam:  /78 (BP Location: Left arm, Patient Position: Sitting, BP Cuff Size: Large adult)   Pulse 70   Temp 36.5 °C (97.7 °F)   Resp 16   Ht 1.854 m (6' 1\")   Wt 104 kg (229 lb)   SpO2 95%   Gen. appears healthy in no distress   Skin appropriate for sex and age   Neck trachea is midline  Lungs unlabored breathing  Heart regular rate  Neuro gait and station normal   Psych appropriate, calm, interactive, " well-groomed    Assessment / Plan / Medical Decision makin. Controlled type 2 diabetes mellitus without complication, without long-term current use of insulin ([facility])  -Dramatically improved.  Has drastically changed his diet.  Exercising only at work.  Discussed proper foot care.  Has followed up with his ophthalmologist since diagnoses.  Changed his test strips ordered today as he ran out of test strips early but we discussed in the long run that he can decrease blood sugar checks to once daily in the morning before breakfast and then as needed if he is not feeling well or has any symptoms of hypoglycemia.  Completely off insulin although he will hang onto these just in case.  Compliant with daily Metformin.  Referred to meet with a nutritionist.  Follow-up 2 months for A1c check.  We will do microalbumin at his next visit along with lab slips for his physical this summer to include a CMP and lipid panel.  - REFERRAL TO [facility] IMPROVEMENT PROGRAMS (HIP)  - Blood Glucose Test Strips; Test strips order: Test strips for one touch. Sig: use fasting in the morning before breakfast and before each meal as well as at bedtime. #120 RF x 3  Dispense: 120 Each; Refill: 3    2. Obstructive sleep apnea syndrome  -An order will be faxed over for a new CPAP machine.  Discussed the compliance of using his CPAP machine.  - NON SPECIFIED; New cpap machine, tubing, masks  Dispense: 1 Each; Refill: 1    3.  Hypertension:  Stable.  Continue same.

## 2021-03-15 ENCOUNTER — PATIENT MESSAGE (OUTPATIENT)
Dept: MEDICAL GROUP | Facility: LAB | Age: 51
End: 2021-03-15

## 2021-03-15 DIAGNOSIS — G47.33 OBSTRUCTIVE SLEEP APNEA SYNDROME: ICD-10-CM

## 2021-03-15 NOTE — TELEPHONE ENCOUNTER
----- Message from Junior Massey sent at 3/15/2021  1:32 PM PDT -----  Regarding: RE:CPAP Supplies  Contact: [phone]  It been over 7 years, i don't remember the outfit for say....might have to start from scratch...

## 2021-04-14 ENCOUNTER — OFFICE VISIT (OUTPATIENT)
Dept: HEALTH INFORMATION MANAGEMENT | Facility: MEDICAL CENTER | Age: 51
End: 2021-04-14
Payer: COMMERCIAL

## 2021-04-14 VITALS — BODY MASS INDEX: 31.14 KG/M2 | HEIGHT: 73 IN | WEIGHT: 235 LBS

## 2021-04-14 DIAGNOSIS — E11.9 CONTROLLED TYPE 2 DIABETES MELLITUS WITHOUT COMPLICATION, WITHOUT LONG-TERM CURRENT USE OF INSULIN (HCC): ICD-10-CM

## 2021-04-14 PROCEDURE — 97802 MEDICAL NUTRITION INDIV IN: CPT | Performed by: DIETITIAN, REGISTERED

## 2021-04-14 ASSESSMENT — FIBROSIS 4 INDEX: FIB4 SCORE: 1.3

## 2021-04-14 NOTE — PROGRESS NOTES
"4/14/2021    DINA Helm  51 y.o.   Time in/out: 10:06-11:09 am     Anthropometrics/Objective  Vitals:    04/14/21 1401   Weight: 107 kg (235 lb)   Height: 1.854 m (6' 1\")       Body mass index is 31 kg/m².    Stated Goal Weight: 210 lb   Estimated caloric needs ~2000 kcals ([place] [facility])    See comprehensive patient history form for further information     Subjective:  - New T2DM dx 1/2021, HgbA1c in pre-diabetic range prior   - Was started on insulin therapy at dx and metformin and now no longer needing insulin with reported glycemic control  - Active walking at work / [employer]   - Drastic dietary changes with current intake mainly protein and non-starchy vegetable base   - Removed a majority of carbohydrate containing foods from diet including fruit and grains  - Switched to zero sugar containing beverages and has reduced alcohol intake   - Reports MD with recommendations to limit total carb intake to 90 grams per day     Nutrition Diagnosis (PES Statement)  · Altered nutrition related lab values related to endocrine dysfunction as evidenced by last HgbA1c of 13.4%, new T2DM dx 1/2021.     Client history:  Condition(s) associated with a diagnosis or treatment (specify) T2DM, essential hypertension, LEORA.     Biochemical data, medical test and procedures  Lab Results   Component Value Date/Time    HBA1C 13.4 (A) 01/19/2021 02:46 PM     Lab Results   Component Value Date/Time    CHOLSTRLTOT 163 07/06/2020 09:33 AM    LDL 82 07/06/2020 09:33 AM    HDL 63 07/06/2020 09:33 AM    TRIGLYCERIDE 88 07/06/2020 09:33 AM         Nutrition Intervention  Nutrition Prescription  Carb choices/grams: Recommend 30-45 grams per meal      Meal and Snack  Recommend a heart healthy diet, carb controlled     Comprehensive Nutrition education Instruction or training leading to in-depth nutrition related knowledge about:  - Nutrition basics  - Affect of macronutrients on blood sugar   - Plate planner for meal balance and " portion control  - Heart healthy recommendations   - Alcohol in moderation / safety with diabetes   - Carb counting utilizing the food label and carb exchange list   - Diet sustainability     Monitoring & Evaluation Plan    Behavioral-Environmental:  Behavior: Consistent CHO intake throughout the day, read food labels   Physical activity: Maintain consistency, increase as tolerated/feasible     Food / Nutrient Intake:  Food intake: Use the plate method recommendations for portions/balance at meals  Fluid/Beverage intake: Avoid all sweetened beverages unless utilizing to treat for low blood sugar, continue reduction of alcoholic beverages   Macronutrient intake: Up to 30-45 grams CHO with meals 0-15 grams carb with snacks    Assessment Notes:  The pt has made significant changes to his current diet with noted improvements to his glycemic control and with d/c of insulin use. From review of current dietary pattern, intake appears likely high in saturated fat and sodium and lacking fiber and phytonutrients. Discussed benefits of more balanced meals especially in consideration for long-term sustainability and the benefits of complex carbs in moderatoin as part of a nutrient dense diet.   Reviewed nutrition basics, healthier choices to make within each food group, choosing complex carbs vs simple sugars, and how each of the macronutrients affect blood glucose. Showed pt how to identify total carbohydrate content of foods utilizing the food label and carb exchange list. With pt's preference for lower carb intake, recommended range of 30-45 grams per meal to allow for less restriction and to better meet nutrition needs. Encouraged the pt to call with any future questions or concerns.     Follow-up prn

## 2021-04-26 ENCOUNTER — OFFICE VISIT (OUTPATIENT)
Dept: MEDICAL GROUP | Facility: LAB | Age: 51
End: 2021-04-26
Payer: COMMERCIAL

## 2021-04-26 VITALS
OXYGEN SATURATION: 97 % | HEIGHT: 73 IN | RESPIRATION RATE: 12 BRPM | HEART RATE: 58 BPM | WEIGHT: 235 LBS | DIASTOLIC BLOOD PRESSURE: 78 MMHG | TEMPERATURE: 97.2 F | BODY MASS INDEX: 31.14 KG/M2 | SYSTOLIC BLOOD PRESSURE: 120 MMHG

## 2021-04-26 DIAGNOSIS — E11.9 CONTROLLED TYPE 2 DIABETES MELLITUS WITHOUT COMPLICATION, WITHOUT LONG-TERM CURRENT USE OF INSULIN (HCC): ICD-10-CM

## 2021-04-26 DIAGNOSIS — Z23 NEED FOR TDAP VACCINATION: ICD-10-CM

## 2021-04-26 DIAGNOSIS — Z00.00 PREVENTATIVE HEALTH CARE: ICD-10-CM

## 2021-04-26 LAB
HBA1C MFR BLD: 6.1 % (ref 0–5.6)
INT CON NEG: NEGATIVE
INT CON POS: POSITIVE

## 2021-04-26 PROCEDURE — 99213 OFFICE O/P EST LOW 20 MIN: CPT | Mod: 25 | Performed by: NURSE PRACTITIONER

## 2021-04-26 PROCEDURE — 90471 IMMUNIZATION ADMIN: CPT | Performed by: NURSE PRACTITIONER

## 2021-04-26 PROCEDURE — 90715 TDAP VACCINE 7 YRS/> IM: CPT | Performed by: NURSE PRACTITIONER

## 2021-04-26 PROCEDURE — 83036 HEMOGLOBIN GLYCOSYLATED A1C: CPT | Performed by: NURSE PRACTITIONER

## 2021-04-26 RX ORDER — METFORMIN HYDROCHLORIDE 500 MG/1
500 TABLET, EXTENDED RELEASE ORAL DAILY
Qty: 90 TABLET | Refills: 3 | Status: SHIPPED | OUTPATIENT
Start: 2021-04-26 | End: 2022-06-29

## 2021-04-26 ASSESSMENT — FIBROSIS 4 INDEX: FIB4 SCORE: 1.3

## 2021-04-26 NOTE — PROGRESS NOTES
"Chief Complaint   Patient presents with   • Diabetes Follow-up       HPI  Trell is a 51-year-old established male here to follow-up on newly diagnosed type 2 diabetes.  Diabetes was newly dx issue 1/2021 with an A1c of 13.  He has been very diligent about cutting out simple sugars, limiting beer, exercising and checking his blood sugars.  Blood sugars have been excellent, typically ranging .  Now seeing a nutritionist which was helpful and has increased his potato intake which has raised  so he subsequently cut back on carbs.  Very physically active - walks up to 18,000 steps 4 d per week at work.  Denies foot numbness or tingling, difficult to heal wounds or vision changes.  Overall energy is good.  Taking metformin at dinner - denies diarrhea / gas / bloating.    Denies issues with bowels / bladder.          Past medical, surgical, family, and social history is reviewed and updated in Epic chart by me today.   Medications and allergies reviewed and updated in Epic chart by me today.     ROS:   As documented in history of present illness above    Exam:  /78 (BP Location: Right arm, Patient Position: Sitting, BP Cuff Size: Large adult)   Pulse (!) 58   Temp 36.2 °C (97.2 °F)   Resp 12   Ht 1.854 m (6' 1\")   Wt 107 kg (235 lb)   SpO2 97%   Constitutional: Alert, no distress, plus 3 vital signs  Skin:  Warm, dry, no rashes invisible areas   Neck: Trachea midline, no masses, no thyromegaly  Respiratory: Unlabored respiration, lungs clear to auscultation, no wheezes, no rhonchi  Cardiovascular: Normal rate and rhythm, no murmur, no edema  Psych: Alert, pleasant, well-groomed, normal affect  Monofilament testing with a 10 gram force: sensation intact: intact bilaterally  Visual Inspection: Feet without maceration, ulcers, fissures.  Pedal pulses: intact bilaterally      Assessment / Plan / Medical Decision making:   \"  1. Controlled type 2 diabetes mellitus without complication, without long-term " "current use of insulin ([facility])  POCT  A1C   2. Preventative health care  Comp Metabolic Panel    CBC WITH DIFFERENTIAL    Lipid Profile    HEMOGLOBIN A1C    MICROALBUMIN CREAT RATIO URINE   3. Need for Tdap vaccination  Tdap =>6yo IM   \"  A1c is excellent at 6.1 today, discussed that he has transitioned to prediabetes from out-of-control diabetes through diet, exercise and Metformin.  He is off of insulin.  Discussed the importance of daily proper foot care and seeing his eye doctor at least once a year.  He will continue on a diabetic diet and email me if home blood sugar readings are consistently greater than 150.  Recommend a full updated lab panel and following up in July for a physical.    Updated tdap today.  covid vaccines are UTD.    "

## 2021-07-08 DIAGNOSIS — I10 ESSENTIAL HYPERTENSION: ICD-10-CM

## 2021-07-08 RX ORDER — LISINOPRIL 20 MG/1
TABLET ORAL
Qty: 90 TABLET | Refills: 3 | Status: SHIPPED | OUTPATIENT
Start: 2021-07-08 | End: 2022-09-01 | Stop reason: SDUPTHER

## 2021-07-08 RX ORDER — TRIAMTERENE AND HYDROCHLOROTHIAZIDE 37.5; 25 MG/1; MG/1
CAPSULE ORAL
Qty: 90 CAPSULE | Refills: 3 | Status: SHIPPED | OUTPATIENT
Start: 2021-07-08 | End: 2022-08-15

## 2021-07-08 NOTE — TELEPHONE ENCOUNTER
Received request via: Pharmacy    Was the patient seen in the last year in this department? Yes  LOV 04/26/2021  Does the patient have an active prescription (recently filled or refills available) for medication(s) requested? No

## 2021-07-26 ENCOUNTER — HOSPITAL ENCOUNTER (OUTPATIENT)
Dept: LAB | Facility: MEDICAL CENTER | Age: 51
End: 2021-07-26
Attending: NURSE PRACTITIONER
Payer: COMMERCIAL

## 2021-07-26 ENCOUNTER — OFFICE VISIT (OUTPATIENT)
Dept: MEDICAL GROUP | Facility: LAB | Age: 51
End: 2021-07-26
Payer: COMMERCIAL

## 2021-07-26 VITALS
WEIGHT: 234 LBS | SYSTOLIC BLOOD PRESSURE: 132 MMHG | HEART RATE: 68 BPM | TEMPERATURE: 97.8 F | RESPIRATION RATE: 16 BRPM | BODY MASS INDEX: 31.01 KG/M2 | OXYGEN SATURATION: 96 % | HEIGHT: 73 IN | DIASTOLIC BLOOD PRESSURE: 80 MMHG

## 2021-07-26 DIAGNOSIS — Z00.00 PREVENTATIVE HEALTH CARE: ICD-10-CM

## 2021-07-26 DIAGNOSIS — Z00.00 WELL ADULT EXAM: ICD-10-CM

## 2021-07-26 DIAGNOSIS — Z23 NEED FOR PNEUMOCOCCAL VACCINATION: ICD-10-CM

## 2021-07-26 LAB
ALBUMIN SERPL BCP-MCNC: 4.5 G/DL (ref 3.2–4.9)
ALBUMIN/GLOB SERPL: 1.6 G/DL
ALP SERPL-CCNC: 60 U/L (ref 30–99)
ALT SERPL-CCNC: 59 U/L (ref 2–50)
ANION GAP SERPL CALC-SCNC: 11 MMOL/L (ref 7–16)
AST SERPL-CCNC: 41 U/L (ref 12–45)
BASOPHILS # BLD AUTO: 1.4 % (ref 0–1.8)
BASOPHILS # BLD: 0.08 K/UL (ref 0–0.12)
BILIRUB SERPL-MCNC: 0.7 MG/DL (ref 0.1–1.5)
BUN SERPL-MCNC: 14 MG/DL (ref 8–22)
CALCIUM SERPL-MCNC: 9.5 MG/DL (ref 8.5–10.5)
CHLORIDE SERPL-SCNC: 100 MMOL/L (ref 96–112)
CHOLEST SERPL-MCNC: 185 MG/DL (ref 100–199)
CO2 SERPL-SCNC: 27 MMOL/L (ref 20–33)
CREAT SERPL-MCNC: 0.96 MG/DL (ref 0.5–1.4)
CREAT UR-MCNC: 92.22 MG/DL
EOSINOPHIL # BLD AUTO: 0.49 K/UL (ref 0–0.51)
EOSINOPHIL NFR BLD: 8.4 % (ref 0–6.9)
ERYTHROCYTE [DISTWIDTH] IN BLOOD BY AUTOMATED COUNT: 47.6 FL (ref 35.9–50)
EST. AVERAGE GLUCOSE BLD GHB EST-MCNC: 128 MG/DL
FASTING STATUS PATIENT QL REPORTED: NORMAL
GLOBULIN SER CALC-MCNC: 2.8 G/DL (ref 1.9–3.5)
GLUCOSE SERPL-MCNC: 124 MG/DL (ref 65–99)
HBA1C MFR BLD: 6.1 % (ref 4–5.6)
HCT VFR BLD AUTO: 46.7 % (ref 42–52)
HDLC SERPL-MCNC: 66 MG/DL
HGB BLD-MCNC: 15.2 G/DL (ref 14–18)
IMM GRANULOCYTES # BLD AUTO: 0.01 K/UL (ref 0–0.11)
IMM GRANULOCYTES NFR BLD AUTO: 0.2 % (ref 0–0.9)
LDLC SERPL CALC-MCNC: 95 MG/DL
LYMPHOCYTES # BLD AUTO: 3.1 K/UL (ref 1–4.8)
LYMPHOCYTES NFR BLD: 53.1 % (ref 22–41)
MCH RBC QN AUTO: 25.4 PG (ref 27–33)
MCHC RBC AUTO-ENTMCNC: 32.5 G/DL (ref 33.7–35.3)
MCV RBC AUTO: 78.1 FL (ref 81.4–97.8)
MICROALBUMIN UR-MCNC: <1.2 MG/DL
MICROALBUMIN/CREAT UR: NORMAL MG/G (ref 0–30)
MONOCYTES # BLD AUTO: 0.8 K/UL (ref 0–0.85)
MONOCYTES NFR BLD AUTO: 13.7 % (ref 0–13.4)
NEUTROPHILS # BLD AUTO: 1.36 K/UL (ref 1.82–7.42)
NEUTROPHILS NFR BLD: 23.2 % (ref 44–72)
NRBC # BLD AUTO: 0 K/UL
NRBC BLD-RTO: 0 /100 WBC
PLATELET # BLD AUTO: 170 K/UL (ref 164–446)
PMV BLD AUTO: 11.4 FL (ref 9–12.9)
POTASSIUM SERPL-SCNC: 4.3 MMOL/L (ref 3.6–5.5)
PROT SERPL-MCNC: 7.3 G/DL (ref 6–8.2)
RBC # BLD AUTO: 5.98 M/UL (ref 4.7–6.1)
SODIUM SERPL-SCNC: 138 MMOL/L (ref 135–145)
TRIGL SERPL-MCNC: 122 MG/DL (ref 0–149)
WBC # BLD AUTO: 5.8 K/UL (ref 4.8–10.8)

## 2021-07-26 PROCEDURE — 90732 PPSV23 VACC 2 YRS+ SUBQ/IM: CPT | Performed by: NURSE PRACTITIONER

## 2021-07-26 PROCEDURE — 82570 ASSAY OF URINE CREATININE: CPT

## 2021-07-26 PROCEDURE — 85025 COMPLETE CBC W/AUTO DIFF WBC: CPT

## 2021-07-26 PROCEDURE — 80053 COMPREHEN METABOLIC PANEL: CPT

## 2021-07-26 PROCEDURE — 36415 COLL VENOUS BLD VENIPUNCTURE: CPT

## 2021-07-26 PROCEDURE — 99396 PREV VISIT EST AGE 40-64: CPT | Mod: 25 | Performed by: NURSE PRACTITIONER

## 2021-07-26 PROCEDURE — 80061 LIPID PANEL: CPT

## 2021-07-26 PROCEDURE — 82043 UR ALBUMIN QUANTITATIVE: CPT

## 2021-07-26 PROCEDURE — 83036 HEMOGLOBIN GLYCOSYLATED A1C: CPT

## 2021-07-26 PROCEDURE — 90471 IMMUNIZATION ADMIN: CPT | Performed by: NURSE PRACTITIONER

## 2021-07-26 ASSESSMENT — FIBROSIS 4 INDEX: FIB4 SCORE: 1.3

## 2021-07-26 NOTE — PROGRESS NOTES
Subjective:     CC:   Chief Complaint   Patient presents with   • Annual Exam       HPI:   Trell is a 51 y.o. est male who presents for annual exam - feeling well.   Newly diagnosed with type 2 diabetes last January and got this under control very quickly by cutting out excessive amounts of beer, potato and sugar intake.  He did not do labs prior to arrival, last A1c was April 26 at 6.1.  Does admit that he is back to drinking 8-9 [employer] light most nights of the week but is staying away from micro brews because according to him these were higher in calories and caused him to weight gain.  Vaccines are up-to-date with the exception of pneumococcal.    He  has a past medical history of Asthma and Hypertension.  He  has a past surgical history that includes ankle arthroscopy; shoulder arthroscopy; and scrotum incision and drainage.    Family History   Problem Relation Age of Onset   • Cancer Mother         liver   • Diabetes Mother    • Hypertension Mother    • Hypertension Father    • Stroke Sister         49 - one sister   • Stroke Brother         51 - one brother   • Heart Disease Maternal Grandmother         MI     Social History     Tobacco Use   • Smoking status: Never Smoker   • Smokeless tobacco: Never Used   Vaping Use   • Vaping Use: Never used   Substance Use Topics   • Alcohol use: Yes     Alcohol/week: 50.4 oz     Types: 84 Cans of beer per week   • Drug use: No     He  has no history on file for sexual activity.    Patient Active Problem List    Diagnosis Date Noted   • Controlled type 2 diabetes mellitus without complication, without long-term current use of insulin ([facility]) 04/26/2021   • Obstructive sleep apnea syndrome 02/23/2021   • Varicocele 01/28/2020   • Hydrocele of testis 01/28/2020   • Cyst of epididymis 01/28/2020   • Essential hypertension 06/24/2019   • Mild intermittent asthma without complication 06/24/2019   • Iron overload 06/24/2019     Current Outpatient Medications   Medication Sig  Dispense Refill   • lisinopril (PRINIVIL) 20 MG Tab TAKE 1 TABLET BY MOUTH EVERY DAY 90 tablet 3   • triamterene/hctz (MAXZIDE-25/DYAZIDE) 37.5-25 MG Cap TAKE 1 CAPSULE BY MOUTH EVERY DAY IN THE MORNING 90 capsule 3   • metFORMIN ER (GLUCOPHAGE XR) 500 MG TABLET SR 24 HR Take 1 tablet by mouth every day. With dinner 90 tablet 3   • Blood Glucose Test Strips Test strips order: Test strips for one touch. Sig: use fasting in the morning before breakfast and before each meal as well as at bedtime. #120 RF x 3 120 Each 3   • NON SPECIFIED New cpap machine, tubing, masks 1 Each 1   • Insulin Pen Needle (B-D UF III MINI PEN NEEDLES) 31G X 5 MM Misc Use with Humalog and inject up to three times daily based on sliding scale. Use with Basglar every evening 100 Each 3   • Lancets Lancets order: Lancets for for meter covered by insurance. Sig: use fasting in the morning before breakfast and prn ssx high or low sugar #100 RF x 3 100 Each 3   • triamcinolone acetonide (KENALOG) 0.1 % Cream aaa twice daily. 453.6 g 1   • albuterol 108 (90 Base) MCG/ACT Aero Soln inhalation aerosol Inhale 2 Puffs by mouth every 6 hours as needed for Shortness of Breath. 8.5 g 5   • omeprazole (PRILOSEC) 20 MG delayed-release capsule Take 1 Cap by mouth every day. 90 Cap 3     No current facility-administered medications for this visit.         Review of Systems   Constitutional: Negative for fever, chills and malaise/fatigue.   HENT: Negative for congestion.    Eyes: Negative for pain.   Respiratory: Negative for cough and shortness of breath.    Cardiovascular: Negative for chest pain and leg swelling.   Gastrointestinal: Negative for nausea, vomiting, abdominal pain and diarrhea.   Genitourinary: Negative for dysuria and hematuria.   Skin: Negative for rash.   Neurological: Negative for dizziness, focal weakness and headaches.   Endo/Heme/Allergies: Does not bruise/bleed easily.   Psychiatric/Behavioral: Negative for depression.  The patient is  "not nervous/anxious.      Objective:   /80 (BP Location: Left arm, Patient Position: Sitting, BP Cuff Size: Adult)   Pulse 68   Temp 36.6 °C (97.8 °F) (Temporal)   Resp 16   Ht 1.854 m (6' 1\")   Wt 106 kg (234 lb)   SpO2 96%       Physical Exam:  Constitutional: Well-developed and well-nourished. Not diaphoretic. No distress.   Skin: Skin is warm and dry. No rash noted.  Head: Atraumatic without lesions.  Eyes: Conjunctivae and extraocular motions are normal. Pupils are equal, round, and reactive to light. No scleral icterus.   Ears:  External ears unremarkable. Tympanic membranes clear and intact.  Nose: Nares patent. Septum midline. Turbinates without erythema nor edema. No discharge.   Mouth/Throat: Tongue normal. Oropharynx is clear and moist. Posterior pharynx without erythema or exudates.  Neck: Supple, trachea midline. Normal range of motion. No thyromegaly present. No lymphadenopathy--cervical or supraclavicular.  Cardiovascular: Regular rate and rhythm, S1 and S2 without murmur, rubs, or gallops.    Respiratory: Effort normal. Clear to auscultation throughout. No adventitious sounds.   Abdomen: Soft, non tender.  He does have a non-tender reducible midline upper abdominal wall hernia.    Extremities: No cyanosis, clubbing, erythema, nor edema. Distal pulses intact and symmetric.   Musculoskeletal: All major joints AROM full in all directions without pain.  Neurological: Alert and oriented x 3. Grossly non-focal. Strength and sensation grossly intact. DTRs 2+/3 and symmetric.   Psychiatric:  Behavior, mood, and affect are appropriate.      Assessment and Plan:     1. Well adult exam     2. Need for pneumococcal vaccination  Pneumovax Vaccine (PPSV23)     Updated pneumococcal vaccines today, PCV 23.  Encouraged him to reduce his intake of beer and follow a strict diabetic diet.  Fortunately weight is stable -although we discussed the goal of getting down towards 220.  His exercise is mainly " through getting up to 18,000 steps at work most days of the week.  Patient had labs drawn in our lobby today to include a urine for microalbumin.  Otherwise vaccines and healthcare maintenance is up-to-date.  He understands the importance of good foot care.  Non-smoker.  Follow-up 3 to 6 months depending on lab results.

## 2021-09-22 ENCOUNTER — OFFICE VISIT (OUTPATIENT)
Dept: URGENT CARE | Facility: CLINIC | Age: 51
End: 2021-09-22
Payer: COMMERCIAL

## 2021-09-22 VITALS
WEIGHT: 236 LBS | RESPIRATION RATE: 16 BRPM | DIASTOLIC BLOOD PRESSURE: 86 MMHG | OXYGEN SATURATION: 97 % | SYSTOLIC BLOOD PRESSURE: 130 MMHG | HEART RATE: 83 BPM | HEIGHT: 73 IN | BODY MASS INDEX: 31.28 KG/M2 | TEMPERATURE: 98.7 F

## 2021-09-22 DIAGNOSIS — K11.20 SUBMANDIBULAR GLAND INFLAMMATION: ICD-10-CM

## 2021-09-22 DIAGNOSIS — R05.9 COUGH: ICD-10-CM

## 2021-09-22 DIAGNOSIS — R59.0 LYMPHADENOPATHY OF RIGHT CERVICAL REGION: ICD-10-CM

## 2021-09-22 PROCEDURE — 99214 OFFICE O/P EST MOD 30 MIN: CPT | Performed by: PHYSICIAN ASSISTANT

## 2021-09-22 RX ORDER — DEXTROMETHORPHAN HYDROBROMIDE AND PROMETHAZINE HYDROCHLORIDE 15; 6.25 MG/5ML; MG/5ML
5 SYRUP ORAL 4 TIMES DAILY PRN
Qty: 120 ML | Refills: 0 | Status: SHIPPED | OUTPATIENT
Start: 2021-09-22 | End: 2021-09-28

## 2021-09-22 RX ORDER — AMOXICILLIN AND CLAVULANATE POTASSIUM 875; 125 MG/1; MG/1
1 TABLET, FILM COATED ORAL 2 TIMES DAILY
Qty: 20 TABLET | Refills: 0 | Status: SHIPPED | OUTPATIENT
Start: 2021-09-22 | End: 2021-10-02

## 2021-09-22 ASSESSMENT — ENCOUNTER SYMPTOMS
MYALGIAS: 0
VOMITING: 0
CHILLS: 0
ABDOMINAL PAIN: 0
COUGH: 0
SORE THROAT: 0
SHORTNESS OF BREATH: 0
DIARRHEA: 0
FEVER: 0
NAUSEA: 0
HEADACHES: 0

## 2021-09-22 ASSESSMENT — FIBROSIS 4 INDEX: FIB4 SCORE: 1.54

## 2021-09-23 NOTE — PROGRESS NOTES
"Subjective     Vimal Cristhian Massey is a 51 y.o. male who presents with Other (swelling in the right lymph node under chin and now swelling in the tongue)            The patient is here with complaints of several days swelling of the right lymph node below his jaw. Today he noticed a sore underneath his tongue. He denies fever or chills. No nausea, vomiting or body aches. He has had no sore throat or ear pain. He has been taking Vitamin C and Elderberry for his symptoms with no relief. He also complains of a cough for over 1 month- dry cough without sputum. He has no shortness of breath or difficulty breathing. He denies difficulty swallowing.    Past Medical History:   Diagnosis Date   • Asthma    • Hypertension        Past Surgical History:   Procedure Laterality Date   • ANKLE ARTHROSCOPY      right - age 42   • SCROTUM INCISION AND DRAINAGE      cystocyle 8/2020   • SHOULDER ARTHROSCOPY      8/2019 - Dr. Han       Family History   Problem Relation Age of Onset   • Cancer Mother         liver   • Diabetes Mother    • Hypertension Mother    • Hypertension Father    • Stroke Sister         49 - one sister   • Stroke Brother         51 - one brother   • Heart Disease Maternal Grandmother         MI       Allergies   Allergen Reactions   • Asa [Aspirin]    • Shellfish Allergy        Medications, Allergies, and current problem list reviewed today in Epic    Review of Systems   Constitutional: Negative for chills and fever.   HENT: Negative for ear pain and sore throat.    Respiratory: Negative for cough and shortness of breath.    Cardiovascular: Negative for chest pain.   Gastrointestinal: Negative for abdominal pain, diarrhea, nausea and vomiting.   Musculoskeletal: Negative for myalgias.   Neurological: Negative for headaches.     All other systems reviewed and are negative.            Objective     /86   Pulse 83   Temp 37.1 °C (98.7 °F) (Temporal)   Resp 16   Ht 1.854 m (6' 1\")   Wt 107 kg (236 lb)   " SpO2 97%   BMI 31.14 kg/m²      Physical Exam  Constitutional:       General: He is not in acute distress.  HENT:      Head: Normocephalic and atraumatic.      Mouth/Throat:      Mouth: Mucous membranes are moist.      Pharynx: No oropharyngeal exudate or posterior oropharyngeal erythema.   Eyes:      Conjunctiva/sclera: Conjunctivae normal.   Cardiovascular:      Rate and Rhythm: Normal rate.   Pulmonary:      Effort: Pulmonary effort is normal. No respiratory distress.   Lymphadenopathy:      Head:      Right side of head: Submandibular adenopathy present.      Comments: Submandibular gland below tongue with edema and erythema. No active drainage.   Skin:     General: Skin is warm and dry.   Neurological:      General: No focal deficit present.      Mental Status: He is alert and oriented to person, place, and time.   Psychiatric:         Mood and Affect: Mood normal.         Behavior: Behavior normal.         Thought Content: Thought content normal.         Judgment: Judgment normal.                             Assessment & Plan        1. Lymphadenopathy of right cervical region      2. Submandibular gland inflammation    Suspect stone vs infection  Encouraged sour candies to stimulate salivation.  - amoxicillin-clavulanate (AUGMENTIN) 875-125 MG Tab; Take 1 Tablet by mouth 2 times a day for 10 days.  Dispense: 20 Tablet; Refill: 0    RTC if no improvement in symptoms or ASAP if any worsening symptoms.    3. Cough    - promethazine-dextromethorphan (PROMETHAZINE-DM) 6.25-15 MG/5ML syrup; Take 5 mL by mouth 4 times a day as needed for Cough for up to 6 days.  Dispense: 120 mL; Refill: 0  Sedation side effects discussed. No Driving or alcohol with medication given.      Differential diagnoses, Supportive care, and indications for immediate follow-up discussed with patient.   Pathogenesis of diagnosis discussed including typical length and natural progression.   Instructed to return to clinic or nearest emergency  department for any change in condition, further concerns, or worsening of symptoms.    The patient demonstrated a good understanding and agreed with the treatment plan.    Malena Green P.A.-C.

## 2021-12-14 DIAGNOSIS — J45.20 MILD INTERMITTENT ASTHMA WITHOUT COMPLICATION: ICD-10-CM

## 2021-12-14 RX ORDER — ALBUTEROL SULFATE 90 UG/1
2 AEROSOL, METERED RESPIRATORY (INHALATION) EVERY 6 HOURS PRN
Qty: 8.5 G | Refills: 5 | Status: SHIPPED | OUTPATIENT
Start: 2021-12-14 | End: 2022-09-01 | Stop reason: SDUPTHER

## 2022-01-17 ENCOUNTER — OFFICE VISIT (OUTPATIENT)
Dept: MEDICAL GROUP | Facility: LAB | Age: 52
End: 2022-01-17
Payer: COMMERCIAL

## 2022-01-17 VITALS
HEIGHT: 73 IN | WEIGHT: 239 LBS | HEART RATE: 70 BPM | OXYGEN SATURATION: 94 % | DIASTOLIC BLOOD PRESSURE: 90 MMHG | SYSTOLIC BLOOD PRESSURE: 140 MMHG | BODY MASS INDEX: 31.68 KG/M2 | TEMPERATURE: 97.7 F | RESPIRATION RATE: 12 BRPM

## 2022-01-17 DIAGNOSIS — G47.30 SLEEP APNEA, UNSPECIFIED TYPE: ICD-10-CM

## 2022-01-17 DIAGNOSIS — R22.1 NECK SWELLING: ICD-10-CM

## 2022-01-17 DIAGNOSIS — E11.9 CONTROLLED TYPE 2 DIABETES MELLITUS WITHOUT COMPLICATION, WITHOUT LONG-TERM CURRENT USE OF INSULIN (HCC): ICD-10-CM

## 2022-01-17 DIAGNOSIS — I10 ESSENTIAL HYPERTENSION: ICD-10-CM

## 2022-01-17 PROCEDURE — 99214 OFFICE O/P EST MOD 30 MIN: CPT | Performed by: NURSE PRACTITIONER

## 2022-01-17 ASSESSMENT — FIBROSIS 4 INDEX: FIB4 SCORE: 1.54

## 2022-01-17 ASSESSMENT — PATIENT HEALTH QUESTIONNAIRE - PHQ9: CLINICAL INTERPRETATION OF PHQ2 SCORE: 0

## 2022-01-17 NOTE — PROGRESS NOTES
"Chief Complaint   Patient presents with   • Other     swelling in lymph nodes X sept         HPI:  Trell is a 52 yo est female here with c/o new onset swollen sensation to right side of neck after eating anything x 5 mo.  initially noticed right sided neck swelling while eating steak 9/2021, which resolved after 3 -4 days - self tx with ibuprofen but went to  b/c of discomfort and was placed on augmentin due to suspected infected salivary gland - this did help.    Now he notes slight swelling on right side every time he eats.   Had deep dental cleaning recently and was told that all is well in his dental health.  Goes to the dentist every 3 months.    Denies dry mouth.    Denies choking.    Wears cpap at night   Denies chronic cough but had a cough in sept that didn't respond to Otc cough syrup but responded to codeine containing syrup.    He is type II diabetic but last A1c was excellent at 6.1%, 5 months ago.  Denies any other associated symptoms.    Exam:  /90 (BP Location: Left arm, Patient Position: Sitting, BP Cuff Size: Large adult)   Pulse 70   Temp 36.5 °C (97.7 °F)   Resp 12   Ht 1.854 m (6' 1\")   Wt 108 kg (239 lb)   SpO2 94%   Gen. appears healthy in no distress   Skin appropriate for sex and age   Neck trachea is midline and I do not appreciate any significant anterior or posterior cervical lymphadenopathy.  No supraclavicular lymphadenopathy.  I do not appreciate significant swelling to right side of face or neck although he states he has not eaten yet today.  ENT: No abnormalities to oropharynx.  Lungs unlabored breathing  Heart regular rate  Neuro gait and station normal   Psych appropriate, calm, interactive, well-groomed    A/P:  \"  1. Neck swelling  Referral to ENT    US-SOFT TISSUES OF HEAD - NECK   2. Controlled type 2 diabetes mellitus without complication, without long-term current use of insulin ([facility])  Comp Metabolic Panel    CBC WITH DIFFERENTIAL    Lipid Profile    HEMOGLOBIN " "A1C    TSH   3. Essential hypertension  Comp Metabolic Panel    CBC WITH DIFFERENTIAL   4. Sleep apnea, unspecified type  Referral to Pulmonary and Sleep Medicine   \"  Recommend ultrasound of salivary gland and consult with ENT.  Encouraged very high water intake and a trial of sugar-free sour candies when the swelling occurs.  Fortunately he is not having any difficulty with choking or swallowing his food.    In terms of other chronic medical issues, recommend updated labs next month and then following up.  Blood pressure borderline today, recommend notify me if home blood pressure readings are consistently greater than 130/80.  Encouraged daily physical exercise and a low-sodium diet.  Discussed the importance of a diabetic diet.    He is due for follow-up regarding current CPAP equipment and referral was renewed as he was unable to go in the past 12 months.  Fortunately he is compliant with his CPAP machine at night.  "

## 2022-01-18 ENCOUNTER — HOSPITAL ENCOUNTER (OUTPATIENT)
Dept: RADIOLOGY | Facility: MEDICAL CENTER | Age: 52
End: 2022-01-18
Attending: NURSE PRACTITIONER
Payer: COMMERCIAL

## 2022-01-18 DIAGNOSIS — R22.1 NECK SWELLING: ICD-10-CM

## 2022-01-18 PROCEDURE — 76536 US EXAM OF HEAD AND NECK: CPT

## 2022-03-14 ENCOUNTER — OFFICE VISIT (OUTPATIENT)
Dept: SLEEP MEDICINE | Facility: MEDICAL CENTER | Age: 52
End: 2022-03-14
Payer: COMMERCIAL

## 2022-03-14 VITALS
HEART RATE: 75 BPM | DIASTOLIC BLOOD PRESSURE: 74 MMHG | HEIGHT: 73 IN | BODY MASS INDEX: 31.14 KG/M2 | RESPIRATION RATE: 16 BRPM | SYSTOLIC BLOOD PRESSURE: 122 MMHG | WEIGHT: 235 LBS | OXYGEN SATURATION: 94 %

## 2022-03-14 DIAGNOSIS — G47.33 OSA ON CPAP: ICD-10-CM

## 2022-03-14 PROCEDURE — 99203 OFFICE O/P NEW LOW 30 MIN: CPT | Performed by: PREVENTIVE MEDICINE

## 2022-03-14 ASSESSMENT — ENCOUNTER SYMPTOMS: SLEEP DISTURBANCE: 0

## 2022-03-14 ASSESSMENT — FIBROSIS 4 INDEX: FIB4 SCORE: 1.57

## 2022-03-14 NOTE — PROGRESS NOTES
"CC: \"Nobody told me to bring in my SD card.\"        HPI:  Junior Cristhian Massey is a 52 y.o.male  kindly referred by REBEKAH Helm. this patient was diagnosed with obstructive sleep apnea in [place] approximately 8 years ago.  He has been using Pap therapy since that time.  He has lived in [place] for approximately 4 years and is concerned that the age of his machine might allow for it to break down at any time.  He had contacted his his insurance company to ask about how he might get a new Pap machine and they informed him that he would need to be seen by a sleep provider to get a new prescription .  Today he was informed that he would need a repeat a sleep test unless we can get the results including the raw data from his sleep test in [place].  He was also informed about the Respironics recall and was given the [facility] sleep medicine letter describing the [place] Respironics recall    COMPLIANCE DATA:  Pt to bring in SD card later this week.  Machine type:  REMSTAR  Date range:  AHI:  TIME USED:  PRESSURE SETTINGS:  LEAK:    Sleep history:  This patient works 12-hour nights at [employer] with a 3 on alternating with a forearm rotation.  He is a never smoker.  He does drink alcohol approximately a sixpack daily to relax after work.  His sleep schedule depends on his work schedule.  Patient reports that he uses his CPAP daily.  Patient denies any symptoms consistent with narcolepsy, parasomnias, or restless legs.  Cross [place] score is 1 out of 24 today.    The patient reports improved symptoms using positive airway pressure. Has experienced no significant problems with mask fit, mask leak, pressure tolerance, excessive airway dryness, nasal congestion, aerophagia, or chest pain.     This patient has a past medical history that includes obstructive sleep apnea, intermittent asthma without complication, iron overload, essential hypertension, hydrocele of testis, and type 2 diabetes without long-term use of " insulin.      Patient Active Problem List    Diagnosis Date Noted   • Controlled type 2 diabetes mellitus without complication, without long-term current use of insulin (HCC) 2021   • Obstructive sleep apnea syndrome 2021   • Varicocele 2020   • Hydrocele of testis 2020   • Cyst of epididymis 2020   • Essential hypertension 2019   • Mild intermittent asthma without complication 2019   • Iron overload 2019       Past Medical History:   Diagnosis Date   • Apnea, sleep    • Asthma    • Chickenpox    • Hypertension    • Shortness of breath    • Sleep apnea    • Wears glasses    • Wheezing    • Whooping cough         Past Surgical History:   Procedure Laterality Date   • ANKLE ARTHROSCOPY      right - age 42   • SCROTUM INCISION AND DRAINAGE      cystocyle 2020   • SHOULDER ARTHROSCOPY      2019 - Dr. Han       Family History   Problem Relation Age of Onset   • Cancer Mother          from liver cancer   • Diabetes Mother    • Hypertension Mother         High blood pressure   • Hypertension Father         High blood pressure   • Asthma Father         He has asthma   • Stroke Sister         49 - one sister   • Stroke Brother         51 - one brother   • Heart Disease Maternal Grandmother         MI       Social History     Socioeconomic History   • Marital status: Single     Spouse name: Not on file   • Number of children: Not on file   • Years of education: Not on file   • Highest education level: Not on file   Occupational History   • Not on file   Tobacco Use   • Smoking status: Never Smoker   • Smokeless tobacco: Never Used   • Tobacco comment: None   Vaping Use   • Vaping Use: Never used   Substance and Sexual Activity   • Alcohol use: Yes     Alcohol/week: 0.0 oz     Comment: daily    • Drug use: No   • Sexual activity: Not on file     Comment: single; two kids; wk: tessla   Other Topics Concern   • Not on file   Social History Narrative   • Not on file  "    Social Determinants of Health     Financial Resource Strain: Not on file   Food Insecurity: Not on file   Transportation Needs: Not on file   Physical Activity: Not on file   Stress: Not on file   Social Connections: Not on file   Intimate Partner Violence: Not on file   Housing Stability: Not on file       Current Outpatient Medications   Medication Sig Dispense Refill   • albuterol 108 (90 Base) MCG/ACT Aero Soln inhalation aerosol Inhale 2 Puffs every 6 hours as needed for Shortness of Breath. 8.5 g 5   • lisinopril (PRINIVIL) 20 MG Tab TAKE 1 TABLET BY MOUTH EVERY DAY 90 tablet 3   • triamterene/hctz (MAXZIDE-25/DYAZIDE) 37.5-25 MG Cap TAKE 1 CAPSULE BY MOUTH EVERY DAY IN THE MORNING 90 capsule 3   • metFORMIN ER (GLUCOPHAGE XR) 500 MG TABLET SR 24 HR Take 1 tablet by mouth every day. With dinner 90 tablet 3   • Blood Glucose Test Strips Test strips order: Test strips for one touch. Sig: use fasting in the morning before breakfast and before each meal as well as at bedtime. #120 RF x 3 120 Each 3   • NON SPECIFIED New cpap machine, tubing, masks 1 Each 1   • Insulin Pen Needle (B-D UF III MINI PEN NEEDLES) 31G X 5 MM Misc Use with Humalog and inject up to three times daily based on sliding scale. Use with Basglar every evening 100 Each 3   • Lancets Lancets order: Lancets for for meter covered by insurance. Sig: use fasting in the morning before breakfast and prn ssx high or low sugar #100 RF x 3 100 Each 3   • triamcinolone acetonide (KENALOG) 0.1 % Cream aaa twice daily. 453.6 g 1   • omeprazole (PRILOSEC) 20 MG delayed-release capsule Take 1 Cap by mouth every day. 90 Cap 3     No current facility-administered medications for this visit.    \"CURRENT RX\"    ALLERGIES: Asa [aspirin] and Shellfish allergy    ROS    Constitutional: Denies  weight loss, fatigue  Cardiovascular: Denies chest pain, tightness, palpitations, swelling in legs/feet, difficulty breathing when lying down but gets better when sitting " "up.   Respiratory: Denies shortness of breath during the day  Sleep: per HPI  Gastrointestinal: Denies  difficulty swallowing, heartburn.  Musculoskeletal: Denies painful joints, sore muscles, back pain.        PHYSICAL EXAM    NECK CIRCUMFERENCE: 18 NC  /74 (BP Location: Left arm, Patient Position: Sitting, BP Cuff Size: Adult)   Pulse 75   Resp 16   Ht 1.854 m (6' 1\")   Wt 107 kg (235 lb)   SpO2 94%   BMI 31.00 kg/m²   Appearance: Well-nourished, looks stated age, no acute distress  Eyes:   EOMI  ENMT: masked  Neck: Supple, trachea midline, no masses  Respiratory effort:  No intercostal retractions or use of accessory muscles  Musculoskeletal:  Grossly normal; gait and station normal  Neurologic: oriented to person, time, place, and purpose; judgement intact  Psychiatric:  No depression, anxiety, agitation      Medical Decision Making       The medical record was reviewed in its entirety including the referral notes, records from primary care, consultants notes, hospital records, lab, imaging, microbiology, immunology, and immunizations.  Care gaps identified and reviewed with the patient.    Diagnostic and titration nocturnal polysomnogram's, home sleep apnea tests, continuous nocturnal oximetry results, multiple sleep latency tests, and compliance reports reviewed.    ASSESSMENT/PLAN:    1. LEORA on CPAP  - Overnight Home Sleep Study; Future  ACCUSOM : 2 night home sleep study      Has been advised to continue the current Pap Therapy.  Also advised to clean equipment frequently, and get new mask and supplies as allowed by insurance and DME.  Patient was advised to NEVER use  OZONE containing cleaning systems such as So Clean.    The risks of untreated sleep apnea were discussed with the patient at length. Patients with LEORA are at increased risk of cardiovascular disease including coronary artery disease, systemic arterial hypertension, pulmonary arterial hypertension, cardiac arrhythmias, and stroke. " LEORA patients have an increased risk of motor vehicle accidents, type 2 diabetes, chronic kidney disease, and non-alcoholic liver disease. The patient was advised to avoid driving a motor vehicle when drowsy.      Have advised the patient to follow up with the appropriate healthcare practitioners for all other medical problems and issues.      RTC 2 weeks after home sleep test is complete         Please note that this dictation was created using voice recognition software.  I have made every reasonable attempt to correct obvious errors, I expect that there are errors of grammar and possibly content that I did not discover before finalizing this note.              Answers for HPI/ROS submitted by the patient on 3/14/2022  Year of your last physical exam: 2021  Results of exam: No issues  Occupation : Operations  Height: 73 inches  Current weight: 235  6 months ago: 232  At age 22: 175  What is the reason for your visit today?: Sleep apnea  Name of person referring you to the Sleep Center: Jihan dowling  Have you ever been hospitalized?: No  Have you ever had problems with anesthesia?: No  Have you experienced post-operative delirium?: No  Any complications with surgery?: No  What year did you receive your last Flu shot?: 2021  What year did you receive you last Pneumonia shot?: 2021  Have you had a TB skin test? If so, please list the year and result:: 2021  Please briefly describe your sleep problem and how old you were when it began.: 44 years old....stop breathing while sleeping, did sleep study.6  How does this affect your daily life and activities? Please also rate how serious of a problem this is (1 = Not at all, 10 = Very Serious).: 6  Have you had any previous evaluations, examinations, or treatment for this sleep problem or any other problems with your sleep? If so, please describe the evaluation, treatment, and results.: I was diagnosed with sleep apnea.  Have you used any medications (prescribed or  otherwise) to help your sleep problem? If yes, include name, amount, frequency, and the prescribing physician.: No meds  If employed, what time do you usually start and end work?: 5pm to 5am  Do you ever change work shifts? If yes, describe how often (never, infrequently, regularly).: Changed two weeks ago but shouldn't change soon  What time do you usually go to bed and wake up on: Weekdays? Weekends?: Weekdays at 1030 am, weekend at 1130 pm  Do you have a regular bed partner?: No  How many minutes does it usually take to fall asleep at night after turning off the lights?: 45 minutes  What do you ordinarily do just prior to turning out the lights and attempting to go to sleep (e.g., reading, TV, baths, etc.)?: Drink alcohol to relax  On average, how many times do you wake up during the night?: 3 times  On average, how many times do you wake up to use the bathroom?: Once  Do you often wake up too early in the morning and are unable to return to sleep?: No  On average, how many hours of sleep do you get per night?: 4 or 5  How do you usually awaken?  Alarm, spontaneously, or other?: Alarm  Is it difficult for you to awaken and get out of bed after sleeping? (Not at all, Sometimes, Very): Not at all  Do you nap or return to bed after arising?: No  Are you bothered by sleepiness during the day?: No  Do you feel that you get too much sleep at night?: No  Do you feel that you get too little sleep at night?: No  Do you usually feel tired during the day? If so, what do you attribute this to?: Nothing  Do you find yourself falling asleep when you don't mean to? : No  If yes, how long does your sleep episode last?: None  Do you feel rested or refreshed after the sleep episode?: Yes  Have you ever suddenly fallen?: No  Have you ever experienced sudden body weakness?: No  If yes, were you aware of the things around you?: No  Was the weakness brought on by any particular event or feeling? If so, briefly describe.: No  "weaknedd  Have you ever experienced weakness or paralysis upon going to sleep?: No  Have you ever experienced weakness or paralysis upon awakening from sleep?: No  If yes for either of the above two questions, please indicate how many times per week does this occur?: None  Have you ever experienced seeing things or hearing voices/noises: That weren't real? On going to sleep? During the night? On awakening from sleep? During the day?: No  Do you have difficulty breathing at night? If yes, briefly describe.: Sleep apnea  How many times per week?: 7  How did you become aware of this, at what age did this first occur, and how many years has this occurred?: Sleep study,2018  Have you been told you snore while asleep? If so, does it disturb a bed partner (or someone in the same room), or someone in the next room?: Yes  Have you ever experienced doing something without being aware of the action? If yes, please describe.: No  How many times per week does this occur?: 7  Have you ever experienced upon lying in bed before sleep or on awakening from sleep: Restlessness of legs, \"nervous legs\", \"creeping crawling\" sensation of legs, or twitching of legs?: No  How many times per week does this occur, and how many minutes does the sensation last?: 0  Does anything relieve the sensations (e.g., getting out of bed, medication, massage)?: Yes  At what age did this first occur, and how many years has this occurred?: 44 for last 10  Have you ever been told that your arms or legs jerk or twitch while you are asleep? If yes, how many times per night does this occur?: No  At what age did this first occur, and how many years has this occurred?: No  Does this seem to awaken you from your sleep?: No  Do you know, or have you ever been told that you do any of the following while sleeping: talk, walk, grit teeth, wet the bed, wake up screaming or seemingly afraid, have disturbing dreams, have unusual movements, wake up with headaches, (males) " have erections? If yes to any of these, please indicate how many times per week, age started, last occurrence, treatment received.: None  Has anyone in your family been known to have any sleep problems? If yes, please list the type of problem (e.g., trouble getting to sleep, too sleepy, bed wetting, etc.), the relationship of this person to you, and the treatment received.: None

## 2022-03-19 ENCOUNTER — HOSPITAL ENCOUNTER (OUTPATIENT)
Facility: MEDICAL CENTER | Age: 52
End: 2022-03-19
Attending: PHYSICIAN ASSISTANT
Payer: COMMERCIAL

## 2022-03-19 ENCOUNTER — OFFICE VISIT (OUTPATIENT)
Dept: URGENT CARE | Facility: CLINIC | Age: 52
End: 2022-03-19
Payer: COMMERCIAL

## 2022-03-19 VITALS
SYSTOLIC BLOOD PRESSURE: 134 MMHG | RESPIRATION RATE: 16 BRPM | OXYGEN SATURATION: 98 % | TEMPERATURE: 97.9 F | HEIGHT: 73 IN | BODY MASS INDEX: 31.57 KG/M2 | DIASTOLIC BLOOD PRESSURE: 88 MMHG | WEIGHT: 238.2 LBS | HEART RATE: 72 BPM

## 2022-03-19 DIAGNOSIS — N34.2 URETHRITIS: ICD-10-CM

## 2022-03-19 LAB
APPEARANCE UR: ABNORMAL
BILIRUB UR STRIP-MCNC: NEGATIVE MG/DL
COLOR UR AUTO: ABNORMAL
GLUCOSE UR STRIP.AUTO-MCNC: NEGATIVE MG/DL
KETONES UR STRIP.AUTO-MCNC: NEGATIVE MG/DL
LEUKOCYTE ESTERASE UR QL STRIP.AUTO: ABNORMAL
NITRITE UR QL STRIP.AUTO: NEGATIVE
PH UR STRIP.AUTO: 6 [PH] (ref 5–8)
PROT UR QL STRIP: NEGATIVE MG/DL
RBC UR QL AUTO: ABNORMAL
SP GR UR STRIP.AUTO: 1.02
UROBILINOGEN UR STRIP-MCNC: 0.2 MG/DL

## 2022-03-19 PROCEDURE — 99214 OFFICE O/P EST MOD 30 MIN: CPT | Performed by: PHYSICIAN ASSISTANT

## 2022-03-19 PROCEDURE — 81002 URINALYSIS NONAUTO W/O SCOPE: CPT | Performed by: PHYSICIAN ASSISTANT

## 2022-03-19 PROCEDURE — 87491 CHLMYD TRACH DNA AMP PROBE: CPT

## 2022-03-19 PROCEDURE — 87086 URINE CULTURE/COLONY COUNT: CPT

## 2022-03-19 PROCEDURE — 87591 N.GONORRHOEAE DNA AMP PROB: CPT

## 2022-03-19 RX ORDER — DOXYCYCLINE HYCLATE 100 MG
100 TABLET ORAL 2 TIMES DAILY
Qty: 14 TABLET | Refills: 0 | Status: SHIPPED | OUTPATIENT
Start: 2022-03-19 | End: 2022-03-26

## 2022-03-19 ASSESSMENT — ENCOUNTER SYMPTOMS
MYALGIAS: 0
EYE PAIN: 0
CONSTIPATION: 0
VOMITING: 0
CHILLS: 0
SHORTNESS OF BREATH: 0
FEVER: 0
COUGH: 0
NAUSEA: 0
HEADACHES: 0
ABDOMINAL PAIN: 0
SORE THROAT: 0
DIARRHEA: 0

## 2022-03-19 ASSESSMENT — FIBROSIS 4 INDEX: FIB4 SCORE: 1.57

## 2022-03-19 NOTE — PROGRESS NOTES
"Subjective:   Junior Cristhian Massey is a 52 y.o. male who presents for Penile Discharge (Pennis white discharge/this morning)      Is a 52-year-old male reporting acute onset of penile discharge from the urethral meatus as well as difficulty initiating stream starting this morning.  He reports concerns over STDs as he has had a new sexual partner within the last 2 weeks and has not been using protection regularly.  He denies any dysuria, frequency or urgency.  He is not having any scrotal pain.  He denies any rash or lesions      Review of Systems   Constitutional: Negative for chills and fever.   HENT: Negative for congestion, ear pain and sore throat.    Eyes: Negative for pain.   Respiratory: Negative for cough and shortness of breath.    Cardiovascular: Negative for chest pain.   Gastrointestinal: Negative for abdominal pain, constipation, diarrhea, nausea and vomiting.   Genitourinary: Negative for dysuria.   Musculoskeletal: Negative for myalgias.   Skin: Negative for rash.   Neurological: Negative for headaches.       Medications, Allergies, and current problem list reviewed today in Epic.     Objective:     /88 (BP Location: Left arm, Patient Position: Sitting)   Pulse 72   Temp 36.6 °C (97.9 °F) (Temporal)   Resp 16   Ht 1.854 m (6' 1\")   Wt 108 kg (238 lb 3.2 oz)   SpO2 98%     Physical Exam  Vitals reviewed.   Constitutional:       Appearance: Normal appearance.   HENT:      Head: Normocephalic and atraumatic.      Right Ear: External ear normal.      Left Ear: External ear normal.      Nose: Nose normal.      Mouth/Throat:      Mouth: Mucous membranes are moist.   Eyes:      Conjunctiva/sclera: Conjunctivae normal.   Cardiovascular:      Rate and Rhythm: Normal rate.   Pulmonary:      Effort: Pulmonary effort is normal.   Genitourinary:     Comments: Exam deferred  Skin:     General: Skin is warm and dry.      Capillary Refill: Capillary refill takes less than 2 seconds.   Neurological:      " Mental Status: He is alert and oriented to person, place, and time.          Lab Results/POC Test Results   Results for orders placed or performed in visit on 03/19/22   POCT Urinalysis   Result Value Ref Range    POC Color Other Negative    POC Appearance Turbid Negative    POC Leukocyte Esterase Small Negative    POC Nitrites Negative Negative    POC Urobiligen 0.2 Negative (0.2) mg/dL    POC Protein Negative Negative mg/dL    POC Urine PH 6.0 5.0 - 8.0    POC Blood Small Negative    POC Specific Gravity 1.025 <1.005 - >1.030    POC Ketones Negative Negative mg/dL    POC Bilirubin Negative Negative mg/dL    POC Glucose Negative Negative mg/dL           Assessment/Plan:     Diagnosis and associated orders:     1. Urethritis  doxycycline (VIBRAMYCIN) 100 MG Tab    cefTRIAXone (ROCEPHIN) 500 mg, lidocaine (XYLOCAINE) 1 % 1.8 mL for IM use    Chlamydia/GC PCR (Urine)    POCT Urinalysis    URINE CULTURE(NEW)      Comments/MDM:     • Likely STI induced urethritis.  Will cover for both gonorrhea and chlamydia with doxycycline and Rocephin.  Discussed risk versus benefits of this.  Urinalysis somewhat equivocal, will send a urine culture to further rule out a bacterial urinary tract infection.  Avoid intercourse until symptoms fully resolved.  Recommend test of cure in full STD panel in 8 to 12 weeks.         Differential diagnosis, natural history, supportive care, and indications for immediate follow-up discussed.    Advised the patient to follow-up with the primary care physician for recheck, reevaluation, and consideration of further management.    Please note that this dictation was created using voice recognition software. I have made a reasonable attempt to correct obvious errors, but I expect that there are errors of grammar and possibly content that I did not discover before finalizing the note.    This note was electronically signed by Carlos Patiño PA-C

## 2022-03-20 LAB
C TRACH DNA SPEC QL NAA+PROBE: NEGATIVE
N GONORRHOEA DNA SPEC QL NAA+PROBE: NEGATIVE
SPECIMEN SOURCE: NORMAL

## 2022-03-22 LAB
BACTERIA UR CULT: NORMAL
SIGNIFICANT IND 70042: NORMAL
SITE SITE: NORMAL
SOURCE SOURCE: NORMAL

## 2022-06-29 RX ORDER — METFORMIN HYDROCHLORIDE 500 MG/1
500 TABLET, EXTENDED RELEASE ORAL DAILY
Qty: 90 TABLET | Refills: 3 | Status: SHIPPED | OUTPATIENT
Start: 2022-06-29

## 2022-07-12 ENCOUNTER — TELEPHONE (OUTPATIENT)
Dept: SLEEP MEDICINE | Facility: MEDICAL CENTER | Age: 52
End: 2022-07-12
Payer: COMMERCIAL

## 2022-07-12 NOTE — TELEPHONE ENCOUNTER
[facility] [employer] contacted office to get a new verbal ICD-10 stating that [employer] does not expect LEORA (G47.33) but does except Unspecified apnea (G47.30) , New ICD was given as a verbal and I also spoke with Dr. Parra and she stated patient would need to complete [employer] HST without the use of his CPAP but if he has any issues breathing he would need to sleep with his head elevated or on a recliner.

## 2022-08-05 NOTE — TELEPHONE ENCOUNTER
[facility] STATING THEY ARE NOT ABLE TO REACH PATIENT AT EITHER NUMBER PROVIDED, BOTH NUMBERS SEEM TO BE OUT OF ORDER.

## 2022-08-15 DIAGNOSIS — I10 ESSENTIAL HYPERTENSION: ICD-10-CM

## 2022-08-15 RX ORDER — TRIAMTERENE AND HYDROCHLOROTHIAZIDE 37.5; 25 MG/1; MG/1
CAPSULE ORAL
Qty: 90 CAPSULE | Refills: 3 | Status: SHIPPED | OUTPATIENT
Start: 2022-08-15

## 2022-08-29 SDOH — ECONOMIC STABILITY: TRANSPORTATION INSECURITY
IN THE PAST 12 MONTHS, HAS LACK OF TRANSPORTATION KEPT YOU FROM MEETINGS, WORK, OR FROM GETTING THINGS NEEDED FOR DAILY LIVING?: NO

## 2022-08-29 SDOH — ECONOMIC STABILITY: HOUSING INSECURITY: IN THE LAST 12 MONTHS, HOW MANY PLACES HAVE YOU LIVED?: 1

## 2022-08-29 SDOH — ECONOMIC STABILITY: FOOD INSECURITY: WITHIN THE PAST 12 MONTHS, THE FOOD YOU BOUGHT JUST DIDN'T LAST AND YOU DIDN'T HAVE MONEY TO GET MORE.: NEVER TRUE

## 2022-08-29 SDOH — ECONOMIC STABILITY: FOOD INSECURITY: WITHIN THE PAST 12 MONTHS, YOU WORRIED THAT YOUR FOOD WOULD RUN OUT BEFORE YOU GOT MONEY TO BUY MORE.: NEVER TRUE

## 2022-08-29 SDOH — ECONOMIC STABILITY: HOUSING INSECURITY
IN THE LAST 12 MONTHS, WAS THERE A TIME WHEN YOU DID NOT HAVE A STEADY PLACE TO SLEEP OR SLEPT IN A SHELTER (INCLUDING NOW)?: NO

## 2022-08-29 SDOH — HEALTH STABILITY: PHYSICAL HEALTH: ON AVERAGE, HOW MANY MINUTES DO YOU ENGAGE IN EXERCISE AT THIS LEVEL?: 10 MIN

## 2022-08-29 SDOH — ECONOMIC STABILITY: INCOME INSECURITY: IN THE LAST 12 MONTHS, WAS THERE A TIME WHEN YOU WERE NOT ABLE TO PAY THE MORTGAGE OR RENT ON TIME?: NO

## 2022-08-29 SDOH — HEALTH STABILITY: PHYSICAL HEALTH: ON AVERAGE, HOW MANY DAYS PER WEEK DO YOU ENGAGE IN MODERATE TO STRENUOUS EXERCISE (LIKE A BRISK WALK)?: 1 DAY

## 2022-08-29 SDOH — ECONOMIC STABILITY: TRANSPORTATION INSECURITY
IN THE PAST 12 MONTHS, HAS THE LACK OF TRANSPORTATION KEPT YOU FROM MEDICAL APPOINTMENTS OR FROM GETTING MEDICATIONS?: NO

## 2022-08-29 SDOH — HEALTH STABILITY: MENTAL HEALTH
STRESS IS WHEN SOMEONE FEELS TENSE, NERVOUS, ANXIOUS, OR CAN'T SLEEP AT NIGHT BECAUSE THEIR MIND IS TROUBLED. HOW STRESSED ARE YOU?: NOT AT ALL

## 2022-08-29 SDOH — ECONOMIC STABILITY: TRANSPORTATION INSECURITY
IN THE PAST 12 MONTHS, HAS LACK OF RELIABLE TRANSPORTATION KEPT YOU FROM MEDICAL APPOINTMENTS, MEETINGS, WORK OR FROM GETTING THINGS NEEDED FOR DAILY LIVING?: NO

## 2022-08-29 SDOH — ECONOMIC STABILITY: INCOME INSECURITY: HOW HARD IS IT FOR YOU TO PAY FOR THE VERY BASICS LIKE FOOD, HOUSING, MEDICAL CARE, AND HEATING?: NOT HARD AT ALL

## 2022-08-29 ASSESSMENT — LIFESTYLE VARIABLES
HOW OFTEN DO YOU HAVE SIX OR MORE DRINKS ON ONE OCCASION: DAILY OR ALMOST DAILY
SKIP TO QUESTIONS 9-10: 0
AUDIT-C TOTAL SCORE: 10
HOW OFTEN DO YOU HAVE A DRINK CONTAINING ALCOHOL: 4 OR MORE TIMES A WEEK
HOW MANY STANDARD DRINKS CONTAINING ALCOHOL DO YOU HAVE ON A TYPICAL DAY: 5 OR 6

## 2022-08-29 ASSESSMENT — SOCIAL DETERMINANTS OF HEALTH (SDOH)
HOW OFTEN DO YOU ATTENT MEETINGS OF THE CLUB OR ORGANIZATION YOU BELONG TO?: NEVER
DO YOU BELONG TO ANY CLUBS OR ORGANIZATIONS SUCH AS CHURCH GROUPS UNIONS, FRATERNAL OR ATHLETIC GROUPS, OR SCHOOL GROUPS?: NO
WITHIN THE PAST 12 MONTHS, YOU WORRIED THAT YOUR FOOD WOULD RUN OUT BEFORE YOU GOT THE MONEY TO BUY MORE: NEVER TRUE
HOW OFTEN DO YOU HAVE SIX OR MORE DRINKS ON ONE OCCASION: DAILY OR ALMOST DAILY
HOW OFTEN DO YOU ATTEND CHURCH OR RELIGIOUS SERVICES?: NEVER
HOW MANY DRINKS CONTAINING ALCOHOL DO YOU HAVE ON A TYPICAL DAY WHEN YOU ARE DRINKING: 5 OR 6
HOW OFTEN DO YOU GET TOGETHER WITH FRIENDS OR RELATIVES?: TWICE A WEEK
HOW OFTEN DO YOU ATTEND CHURCH OR RELIGIOUS SERVICES?: NEVER
HOW OFTEN DO YOU GET TOGETHER WITH FRIENDS OR RELATIVES?: TWICE A WEEK
IN A TYPICAL WEEK, HOW MANY TIMES DO YOU TALK ON THE PHONE WITH FAMILY, FRIENDS, OR NEIGHBORS?: ONCE A WEEK
HOW HARD IS IT FOR YOU TO PAY FOR THE VERY BASICS LIKE FOOD, HOUSING, MEDICAL CARE, AND HEATING?: NOT HARD AT ALL
IN A TYPICAL WEEK, HOW MANY TIMES DO YOU TALK ON THE PHONE WITH FAMILY, FRIENDS, OR NEIGHBORS?: ONCE A WEEK
HOW OFTEN DO YOU ATTENT MEETINGS OF THE CLUB OR ORGANIZATION YOU BELONG TO?: NEVER
DO YOU BELONG TO ANY CLUBS OR ORGANIZATIONS SUCH AS CHURCH GROUPS UNIONS, FRATERNAL OR ATHLETIC GROUPS, OR SCHOOL GROUPS?: NO
HOW OFTEN DO YOU HAVE A DRINK CONTAINING ALCOHOL: 4 OR MORE TIMES A WEEK

## 2022-09-01 ENCOUNTER — OFFICE VISIT (OUTPATIENT)
Dept: MEDICAL GROUP | Facility: LAB | Age: 52
End: 2022-09-01
Payer: COMMERCIAL

## 2022-09-01 ENCOUNTER — HOSPITAL ENCOUNTER (OUTPATIENT)
Dept: LAB | Facility: MEDICAL CENTER | Age: 52
End: 2022-09-01
Attending: NURSE PRACTITIONER
Payer: COMMERCIAL

## 2022-09-01 VITALS
BODY MASS INDEX: 31.94 KG/M2 | RESPIRATION RATE: 12 BRPM | TEMPERATURE: 96.7 F | HEART RATE: 58 BPM | SYSTOLIC BLOOD PRESSURE: 120 MMHG | WEIGHT: 241 LBS | HEIGHT: 73 IN | DIASTOLIC BLOOD PRESSURE: 82 MMHG | OXYGEN SATURATION: 100 %

## 2022-09-01 DIAGNOSIS — I10 ESSENTIAL HYPERTENSION: ICD-10-CM

## 2022-09-01 DIAGNOSIS — Z12.5 SPECIAL SCREENING FOR MALIGNANT NEOPLASM OF PROSTATE: ICD-10-CM

## 2022-09-01 DIAGNOSIS — R74.8 ELEVATED LIVER ENZYMES: ICD-10-CM

## 2022-09-01 DIAGNOSIS — Z12.11 SCREENING FOR COLORECTAL CANCER: ICD-10-CM

## 2022-09-01 DIAGNOSIS — Z23 NEED FOR PNEUMOCOCCAL VACCINATION: ICD-10-CM

## 2022-09-01 DIAGNOSIS — J45.20 MILD INTERMITTENT ASTHMA WITHOUT COMPLICATION: ICD-10-CM

## 2022-09-01 DIAGNOSIS — Z12.12 SCREENING FOR COLORECTAL CANCER: ICD-10-CM

## 2022-09-01 DIAGNOSIS — E11.9 CONTROLLED TYPE 2 DIABETES MELLITUS WITHOUT COMPLICATION, WITHOUT LONG-TERM CURRENT USE OF INSULIN (HCC): ICD-10-CM

## 2022-09-01 DIAGNOSIS — Z99.89 CPAP (CONTINUOUS POSITIVE AIRWAY PRESSURE) DEPENDENCE: ICD-10-CM

## 2022-09-01 LAB
ALBUMIN SERPL BCP-MCNC: 4 G/DL (ref 3.2–4.9)
ALBUMIN/GLOB SERPL: 1.5 G/DL
ALP SERPL-CCNC: 54 U/L (ref 30–99)
ALT SERPL-CCNC: 134 U/L (ref 2–50)
ANION GAP SERPL CALC-SCNC: 10 MMOL/L (ref 7–16)
AST SERPL-CCNC: 78 U/L (ref 12–45)
BASOPHILS # BLD AUTO: 0.8 % (ref 0–1.8)
BASOPHILS # BLD: 0.04 K/UL (ref 0–0.12)
BILIRUB SERPL-MCNC: 1.1 MG/DL (ref 0.1–1.5)
BUN SERPL-MCNC: 10 MG/DL (ref 8–22)
CALCIUM SERPL-MCNC: 9.2 MG/DL (ref 8.5–10.5)
CHLORIDE SERPL-SCNC: 101 MMOL/L (ref 96–112)
CHOLEST SERPL-MCNC: 170 MG/DL (ref 100–199)
CO2 SERPL-SCNC: 26 MMOL/L (ref 20–33)
CREAT SERPL-MCNC: 0.86 MG/DL (ref 0.5–1.4)
EOSINOPHIL # BLD AUTO: 0.23 K/UL (ref 0–0.51)
EOSINOPHIL NFR BLD: 4.5 % (ref 0–6.9)
ERYTHROCYTE [DISTWIDTH] IN BLOOD BY AUTOMATED COUNT: 41.6 FL (ref 35.9–50)
EST. AVERAGE GLUCOSE BLD GHB EST-MCNC: 143 MG/DL
GFR SERPLBLD CREATININE-BSD FMLA CKD-EPI: 105 ML/MIN/1.73 M 2
GLOBULIN SER CALC-MCNC: 2.6 G/DL (ref 1.9–3.5)
GLUCOSE SERPL-MCNC: 111 MG/DL (ref 65–99)
HBA1C MFR BLD: 6.6 % (ref 4–5.6)
HCT VFR BLD AUTO: 44.3 % (ref 42–52)
HDLC SERPL-MCNC: 62 MG/DL
HGB BLD-MCNC: 15 G/DL (ref 14–18)
IMM GRANULOCYTES # BLD AUTO: 0.01 K/UL (ref 0–0.11)
IMM GRANULOCYTES NFR BLD AUTO: 0.2 % (ref 0–0.9)
LDLC SERPL CALC-MCNC: 90 MG/DL
LYMPHOCYTES # BLD AUTO: 2.98 K/UL (ref 1–4.8)
LYMPHOCYTES NFR BLD: 58.8 % (ref 22–41)
MCH RBC QN AUTO: 27.1 PG (ref 27–33)
MCHC RBC AUTO-ENTMCNC: 33.9 G/DL (ref 33.7–35.3)
MCV RBC AUTO: 80 FL (ref 81.4–97.8)
MONOCYTES # BLD AUTO: 0.76 K/UL (ref 0–0.85)
MONOCYTES NFR BLD AUTO: 15 % (ref 0–13.4)
NEUTROPHILS # BLD AUTO: 1.05 K/UL (ref 1.82–7.42)
NEUTROPHILS NFR BLD: 20.7 % (ref 44–72)
NRBC # BLD AUTO: 0 K/UL
NRBC BLD-RTO: 0 /100 WBC
PLATELET # BLD AUTO: 151 K/UL (ref 164–446)
PMV BLD AUTO: 11 FL (ref 9–12.9)
POTASSIUM SERPL-SCNC: 3.7 MMOL/L (ref 3.6–5.5)
PROT SERPL-MCNC: 6.6 G/DL (ref 6–8.2)
PSA SERPL-MCNC: 0.93 NG/ML (ref 0–4)
RBC # BLD AUTO: 5.54 M/UL (ref 4.7–6.1)
SODIUM SERPL-SCNC: 137 MMOL/L (ref 135–145)
TRIGL SERPL-MCNC: 91 MG/DL (ref 0–149)
TSH SERPL DL<=0.005 MIU/L-ACNC: 1.52 UIU/ML (ref 0.38–5.33)
WBC # BLD AUTO: 5.1 K/UL (ref 4.8–10.8)

## 2022-09-01 PROCEDURE — 80061 LIPID PANEL: CPT

## 2022-09-01 PROCEDURE — 99214 OFFICE O/P EST MOD 30 MIN: CPT | Mod: 25 | Performed by: NURSE PRACTITIONER

## 2022-09-01 PROCEDURE — 85025 COMPLETE CBC W/AUTO DIFF WBC: CPT

## 2022-09-01 PROCEDURE — 83036 HEMOGLOBIN GLYCOSYLATED A1C: CPT

## 2022-09-01 PROCEDURE — 84153 ASSAY OF PSA TOTAL: CPT

## 2022-09-01 PROCEDURE — 90677 PCV20 VACCINE IM: CPT | Performed by: NURSE PRACTITIONER

## 2022-09-01 PROCEDURE — 84443 ASSAY THYROID STIM HORMONE: CPT

## 2022-09-01 PROCEDURE — 36415 COLL VENOUS BLD VENIPUNCTURE: CPT

## 2022-09-01 PROCEDURE — 80053 COMPREHEN METABOLIC PANEL: CPT

## 2022-09-01 PROCEDURE — 90471 IMMUNIZATION ADMIN: CPT | Performed by: NURSE PRACTITIONER

## 2022-09-01 PROCEDURE — 82570 ASSAY OF URINE CREATININE: CPT

## 2022-09-01 PROCEDURE — 82043 UR ALBUMIN QUANTITATIVE: CPT

## 2022-09-01 RX ORDER — ALBUTEROL SULFATE 90 UG/1
2 AEROSOL, METERED RESPIRATORY (INHALATION) EVERY 6 HOURS PRN
Qty: 8.5 G | Refills: 5 | Status: SHIPPED | OUTPATIENT
Start: 2022-09-01

## 2022-09-01 RX ORDER — LISINOPRIL 20 MG/1
20 TABLET ORAL
Qty: 90 TABLET | Refills: 3 | Status: SHIPPED | OUTPATIENT
Start: 2022-09-01

## 2022-09-01 ASSESSMENT — FIBROSIS 4 INDEX: FIB4 SCORE: 1.57

## 2022-09-01 NOTE — PROGRESS NOTES
"CC  F/u on chronic issues      HPI:   is a 53 yo est male here to f/u on chronic issues:     Physically feeling pretty well.  He got  a few months ago.  Still working at [employer].  Does not smoke.    Controlled type 2 diabetes mellitus without complication, without long-term current use of insulin (HCC)  Chronic issue.  Taking metformin 500 mg XR daily. Switched from beer to liquor for alcohol intake - one 750 ml per week.  Avoiding bread most days of the week.  Pasta / red meat once a week.  Rare rice.  Eating a lot of vegetables.  Snacks: chips occasionally but not daily.  Doesn't enjoy sweets.   Exercise: work.    He is due for labs, he did not do these prior to arrival.    Hypertension: Chronic issue.  Denies chest pain or trouble breathing.  Denies swelling in his ankles.  Compliant with lisinopril 20 mg daily at night along with triamterene HCTZ in the morning.    Exam:  /82 (BP Location: Left arm, Patient Position: Sitting, BP Cuff Size: Large adult)   Pulse (!) 58   Temp 35.9 °C (96.7 °F)   Resp 12   Ht 1.854 m (6' 1\")   Wt 109 kg (241 lb)   SpO2 100%   Monofilament testing with a 10 gram force: sensation intact: intact bilaterally  Visual Inspection: Feet without maceration, ulcers, fissures.  Pedal pulses: intact bilaterally  Gen. appears healthy in no distress   Skin appropriate for sex and age   Neck trachea is midline.  No thyromegaly  Lungs unlabored breathing.  Lungs are clear to auscultation bilaterally  Heart regular rate and rhythm  Abdomen soft and nontender.  No hepatosplenomegaly.  Neuro gait and station normal   Psych appropriate, calm, interactive, well-groomed    A/P:    1. Controlled type 2 diabetes mellitus without complication, without long-term current use of insulin (HCC)  MICROALBUMIN CREAT RATIO URINE      2. Screening for colorectal cancer  Referral to GI for Colonoscopy      3. Need for pneumococcal vaccination  Pneumococcal Conjugate Vaccine 20-Valent (19 yrs+)    "   4. CPAP (continuous positive airway pressure) dependence        5. Essential hypertension        6. Special screening for malignant neoplasm of prostate  PROSTATE SPECIFIC AG SCREENING        Plans on doing labs in the lobby after our visit, he is fasting.  Up-to-date with his eye doctor and normal monofilament exam today.  Reviewed his weight and encouraged him to lose about 25 pounds over the next 6 months through increasing physical activity and decreased portions.  Continue all current medications.  Refilled albuterol.  Breathing doing well.  Updated PCV 20.  Referred for screening colonoscopy.  We will follow-up with him on MyChart when labs return.  He is struggling to get his new CPAP machine as his was recalled and I encouraged him to call the company once per week until they get back to him.    Follow-up 6 months for A1c in the office.

## 2022-09-01 NOTE — ASSESSMENT & PLAN NOTE
Chronic issue.  Taking metformin 500 mg XR daily. Switched from beer to liquor for alcohol intake - one 750 ml per week.  Avoiding bread most days of the week.  Pasta / red meat once a week.  Rare rice.  Eating a lot of vegetables.  Snacks: chips occasionally but not daily.  Doesn't enjoy sweets.   Exercise: work.

## 2022-09-02 LAB
CREAT UR-MCNC: 91.43 MG/DL
MICROALBUMIN UR-MCNC: <1.2 MG/DL
MICROALBUMIN/CREAT UR: NORMAL MG/G (ref 0–30)

## 2022-10-13 ENCOUNTER — TELEPHONE (OUTPATIENT)
Dept: MEDICAL GROUP | Facility: LAB | Age: 52
End: 2022-10-13
Payer: COMMERCIAL

## 2022-10-13 DIAGNOSIS — G47.33 OBSTRUCTIVE SLEEP APNEA SYNDROME: ICD-10-CM

## 2022-10-25 ENCOUNTER — HOME STUDY (OUTPATIENT)
Dept: SLEEP MEDICINE | Facility: MEDICAL CENTER | Age: 52
End: 2022-10-25
Attending: PREVENTIVE MEDICINE
Payer: COMMERCIAL

## 2022-10-25 DIAGNOSIS — G47.33 OSA ON CPAP: ICD-10-CM

## 2022-10-26 PROCEDURE — 95806 SLEEP STUDY UNATT&RESP EFFT: CPT | Mod: 52 | Performed by: PREVENTIVE MEDICINE

## 2022-11-14 NOTE — PROCEDURES
Interpretation:    This home sleep test was performed on 10/26/22 using a ResDennoo HST( ApneaLink Air)   recording device.       The total recording time was 4  hours and 30 minutes,  and the monitoring time was 4  hours and 7 minutes. Oxygen saturation evaluation time was 4 hours and 20 minutes.    The device recorded  central apneas index of 0.0, and obstructive apneas index of 12.1, and a  mixed apneas index of 0.0 point.  The LEOBARDO (respiratory event index which is a surrogate for the AHI) was 45.6..  The AI (apnea index) was 12.1  The HI (hypopnea index) was 33.5.  The supine LEOBARDO was 45.6.  Most of the respiratory events occurred in the supine position.    The patient experienced 189 desaturations and the oxygen desaturation index was 43.5.  During sleep the average saturation was 90%, the stacie saturation was 76%.  The patient spent 41% of evaluation time with saturations less than 90%.      The ave heart rate during sleep was 57 bpm, the maximum heart rate during sleep was 103 bpm, and the minimum heart rate during sleep was 42 bpm.      NOTE:    An HST cannot provide an AHI; instead this type of study generates an LEOBARDO.  It is not possible ,with this device, to determine a traditional apnea+hypopnea index (AHI) for total sleep time since EEG channels are not used.  Actual sleep estimates require brainwave activity monitoring.         Assessment:    Severe, sleep apnea - LEOBARDO 45.6  Moderate to severe nocturnal oxygen desaturation - stacie saturation 76% - less than 90% for 41% of the evaluation time.  This is the equivalent of 36 minutes at or under 88% oxygen saturation.          Recommendation:    Recommend a positive airway pressure titration.  Alternatively this patient could undergo an empiric titration using a ResMed APAP with initial pressures at a minimum of 6 and a maximum of 16 cmH2O pressure.  This patient will also require a careful mask fit and heated humidification.  Once the patient is comfortable and  compliant on Pap therapy then a full night oximetry study should be done with patient using Pap therapy.  This would allow for clarification of whether or not treating the obstructive sleep events has been effective in treating the oxygen deficit.    Behavior modification is always recommended as well.  Behavior modification includes weight loss, eliminating alcohol and sedatives, and avoiding the supine position.

## 2022-11-16 ENCOUNTER — HOSPITAL ENCOUNTER (OUTPATIENT)
Dept: RADIOLOGY | Facility: MEDICAL CENTER | Age: 52
End: 2022-11-16
Payer: COMMERCIAL

## 2022-11-16 DIAGNOSIS — R79.89 ELEVATED LFTS: ICD-10-CM

## 2022-11-16 PROCEDURE — 76700 US EXAM ABDOM COMPLETE: CPT

## 2022-12-02 ENCOUNTER — OFFICE VISIT (OUTPATIENT)
Dept: SLEEP MEDICINE | Facility: MEDICAL CENTER | Age: 52
End: 2022-12-02
Payer: COMMERCIAL

## 2022-12-02 VITALS
DIASTOLIC BLOOD PRESSURE: 76 MMHG | OXYGEN SATURATION: 93 % | WEIGHT: 234 LBS | BODY MASS INDEX: 31.69 KG/M2 | HEART RATE: 99 BPM | HEIGHT: 72 IN | SYSTOLIC BLOOD PRESSURE: 118 MMHG | RESPIRATION RATE: 16 BRPM

## 2022-12-02 DIAGNOSIS — G47.33 OSA ON CPAP: ICD-10-CM

## 2022-12-02 PROCEDURE — 99214 OFFICE O/P EST MOD 30 MIN: CPT

## 2022-12-02 ASSESSMENT — FIBROSIS 4 INDEX: FIB4 SCORE: 2.23

## 2022-12-02 NOTE — PATIENT INSTRUCTIONS
Please make sure that you are seen within 90 days of receiving your machine, with at least 30 days of use.  Please call scheduling at [phone] if your appointment needs to be moved to meet these parameters.     To meet compliance requirements for insurance please ensure that you use the machine at least 6/7 days a week for at least 4 or more hours a night.    Please bring your entire machine and chip to your first appointment, regardless if the machine is set up for wireless access.    Make sure you bring your chip to every follow-up appointment, regardless of wireless access.

## 2022-12-02 NOTE — PROGRESS NOTES
[facility] Follow-up Visit    Date of Visit: 12/2/2022     CC:  Follow-up for LEORA management      HPI:  Junior Cristhian Massey is a very pleasant 52 y.o. year old male never smoker, with a PMHx of LEORA, asthma, T2DM, HTN who presented to the [facility] for a regular follow up. Last seen in the office on 3/14/2022 with Dr. Parra.     Patient presents for home sleep study results.  Patient is currently using a [employer] CPAP machine set to 8 CWP.  He is a part of the recall and his machine is greater than 5 years old, but Ray requested a new order and because the patient does not have any sleep study on file, the patient had to undergo another home sleep test.  Patient does state that he is moving to [place] in the next couple of weeks.  He does not have an established sleep provider in [place] yet.  I have attempted to download his compliance data wirelessly, but the wireless download does not include AHI score the patient does not have his chip with him.    Patient denies any significant morning headaches, daytime drowsiness, driving drowsy, gasping, apneas, dry mouth, aerophagia, mask leak, skin irritation, or any symptoms of RLS, narcolepsy or any nightmares, sleep walking or acting out of dreams.  He does state that he will have an occasional issue falling asleep and will occasionally snore.  Patient works night shift and goes to bed at 9:30 AM and wakes up at 3:45 PM.  He wakens 2-3 times to use the bathroom but does not have any issues falling back asleep.  He does not nap.    Sleep History:  HSS 10/25/2022-        Patient Active Problem List    Diagnosis Date Noted    LEORA on CPAP 02/23/2021    CPAP (continuous positive airway pressure) dependence 09/01/2022    Controlled type 2 diabetes mellitus without complication, without long-term current use of insulin (HCC) 04/26/2021    Varicocele 01/28/2020    Hydrocele of testis 01/28/2020    Cyst of epididymis 01/28/2020    Essential  hypertension 2019    Mild intermittent asthma without complication 2019    Iron overload 2019     Past Medical History:   Diagnosis Date    Apnea, sleep     Asthma     Chickenpox     Hypertension     Shortness of breath     Sleep apnea     Wears glasses     Wheezing     Whooping cough       Past Surgical History:   Procedure Laterality Date    ANKLE ARTHROSCOPY      right - age 42    SCROTUM INCISION AND DRAINAGE      cystocyle 2020    SHOULDER ARTHROSCOPY      2019 - Dr. Han     Family History   Problem Relation Age of Onset    Cancer Mother          from liver cancer    Diabetes Mother     Hypertension Mother         High blood pressure    Hypertension Father         High blood pressure    Asthma Father         He has asthma    Stroke Sister         49 - one sister    Stroke Brother         51 - one brother    Heart Disease Maternal Grandmother         MI     Social History     Socioeconomic History    Marital status: Single     Spouse name: Not on file    Number of children: Not on file    Years of education: Not on file    Highest education level: Associate degree: occupational, technical, or vocational program   Occupational History    Not on file   Tobacco Use    Smoking status: Never    Smokeless tobacco: Never    Tobacco comments:     None   Vaping Use    Vaping Use: Never used   Substance and Sexual Activity    Alcohol use: Yes     Alcohol/week: 0.0 oz     Comment: daily     Drug use: No    Sexual activity: Not on file     Comment:  2022; two kids; wk: tessla   Other Topics Concern    Not on file   Social History Narrative    Not on file     Social Determinants of Health     Financial Resource Strain: Low Risk     Difficulty of Paying Living Expenses: Not hard at all   Food Insecurity: No Food Insecurity    Worried About Running Out of Food in the Last Year: Never true    Ran Out of Food in the Last Year: Never true   Transportation Needs: No Transportation Needs    Lack  of Transportation (Medical): No    Lack of Transportation (Non-Medical): No   Physical Activity: Insufficiently Active    Days of Exercise per Week: 1 day    Minutes of Exercise per Session: 10 min   Stress: No Stress Concern Present    Feeling of Stress : Not at all   Social Connections: Moderately Isolated    Frequency of Communication with Friends and Family: Once a week    Frequency of Social Gatherings with Friends and Family: Twice a week    Attends Sabianism Services: Never    Active Member of Clubs or Organizations: No    Attends Club or Organization Meetings: Never    Marital Status:    Intimate Partner Violence: Not on file   Housing Stability: Low Risk     Unable to Pay for Housing in the Last Year: No    Number of Places Lived in the Last Year: 1    Unstable Housing in the Last Year: No     Current Outpatient Medications   Medication Sig Dispense Refill    albuterol 108 (90 Base) MCG/ACT Aero Soln inhalation aerosol Inhale 2 Puffs every 6 hours as needed for Shortness of Breath. 8.5 g 5    lisinopril (PRINIVIL) 20 MG Tab Take 1 Tablet by mouth every day. 90 Tablet 3    triamterene/hctz (MAXZIDE-25/DYAZIDE) 37.5-25 MG Cap TAKE 1 CAPSULE BY MOUTH EVERY DAY IN THE MORNING 90 Capsule 3    metFORMIN ER (GLUCOPHAGE XR) 500 MG TABLET SR 24 HR TAKE 1 TABLET BY MOUTH EVERY DAY. WITH DINNER 90 Tablet 3    NON SPECIFIED New cpap machine, tubing, masks 1 Each 1    Blood Glucose Test Strips Test strips order: Test strips for one touch. Sig: use fasting in the morning before breakfast and before each meal as well as at bedtime. #120 RF x 3 (Patient not taking: Reported on 3/19/2022) 120 Each 3    Insulin Pen Needle (B-D UF III MINI PEN NEEDLES) 31G X 5 MM Misc Use with Humalog and inject up to three times daily based on sliding scale. Use with Basglar every evening (Patient not taking: Reported on 3/19/2022) 100 Each 3    Lancets Lancets order: Lancets for for meter covered by insurance. Sig: use fasting in the  morning before breakfast and prn ssx high or low sugar #100 RF x 3 (Patient not taking: Reported on 3/19/2022) 100 Each 3    triamcinolone acetonide (KENALOG) 0.1 % Cream aaa twice daily. (Patient not taking: Reported on 3/19/2022) 453.6 g 1    omeprazole (PRILOSEC) 20 MG delayed-release capsule Take 1 Cap by mouth every day. (Patient not taking: Reported on 3/19/2022) 90 Cap 3     No current facility-administered medications for this visit.      ALLERGIES: Asa [aspirin] and Shellfish allergy    ROS:  Constitutional: Denies fever, chills, sweats,  weight loss, fatigue  Cardiovascular: Denies chest pain, tightness, palpitations, swelling in legs/feet  Respiratory: Denies shortness of breath, cough, sputum, wheezing, painful breathing   Sleep: per HPI  Gastrointestinal: Denies  difficulty swallowing, nausea, abdominal pain, diarrhea, constipation, heartburn.  Musculoskeletal: Denies painful joints, sore muscles,       PHYSICAL EXAM:  /76 (BP Location: Right arm, Patient Position: Sitting, BP Cuff Size: Large adult)   Pulse 99   Resp 16   Ht 1.829 m (6')   Wt 106 kg (234 lb)   SpO2 93%   BMI 31.74 kg/m²   Appearance: Well-nourished, well-developed, no acute distress  Eyes:  No scleral icterus , EOMI  ENMT: No redness of the oropharynx  Lung auscultation:  No wheezes rhonchi rubs or rales  Cardiac: No murmurs, rubs, or gallops; regular rhythm, normal rate; no edema  Musculoskeletal:  Grossly normal; gait and station normal; digits and nails normal  Skin:  No rashes, petechiae, cyanosis  Neurologic: without focal signs; oriented to person, time, place, and purpose; judgement intact  Psychiatric:  No depression, anxiety, agitation  Mallampati score: Class III    Assessment and Plan:    The medical record was reviewed.    Diagnostic and titration nocturnal polysomnogram's, home sleep apnea tests, continuous nocturnal oximetry results, multiple sleep latency tests, and compliance reports reviewed.    Problem  List Items Addressed This Visit       LEORA on CPAP     Sleep Apnea:    The pathophysiology of sleep anea and the increased risk of cardiovascular morbidity from untreated sleep apnea is discussed in detail with the patient.  Urged to avoid supine sleep, weight gain and alcoholic beverages since all of these can worsen sleep apnea. Cautioned against drowsy driving. If feeling sleepy while driving, pull over for a break/nap, rather than persist on the road, in the interest of own safety and that of others on the road.    HSS was reviewed and discussed with the patient.     - Order placed for new CPAP 6-16 CWP  - I would like to do an OPO on CPAP to ensure adequate oxygen saturations, since his study did show that he had moderate to severe nocturnal desaturationst.  - Called the manager at [facility], who stated that the patient should be able to be set up on a new CPAP within 10-14 days as long as insurance approves it and although proper documentation is submitted  - Advised patient that he needs to follow-up with a new provider down in [place].  Provided patient with follow-up parameters.  - Compliance was reinforced  - Advised patient to reach out via [email or web address] if any questions or concerns should arise.   - Equipment replacement schedule:  Mask cushion every month  Nasal pillows 2 times per month  Mask every 6 months  Head gear every 6 months  Tubing every 3 months  Ultra-fine filters 2 times per month  Foam filter every 6 months  Humidifier chamber every 6 months  Chin strap every 6 months    Has been advised to continue the current CPAP, clean equipment frequently, and get new mask and supplies as allowed by insurance and DME. Recommend an earlier appointment, if significant treatment barriers develop.    The risks of untreated sleep apnea were discussed with the patient at length. Patients with LEORA are at increased risk of cardiovascular disease including coronary artery disease, systemic arterial hypertension,  pulmonary arterial hypertension, cardiac arrythmias, and stroke. The patient was advised to avoid driving a motor vehicle when drowsy.    Positive airway pressure will favorably impact many of the adverse conditions and effects provoked by LEORA.         Relevant Orders    DME CPAP     Have advised the patient to follow up with the appropriate healthcare practitioners for all other medical problems and issues.    Return in about 6 weeks (around 1/13/2023), or if symptoms worsen or fail to improve, for 1st complinace .      Please note portions of this record was created using voice recognition software. I have made every reasonable attempt to correct obvious errors, but I expect that there are errors of grammar and possibly content I did not discover before finalizing the note.    Time spent in record review prior to patient arrival, reviewing results, and in face-to-face encounter totaled 30 min.  __________  MAILE Forde  Pulmonary & Sleep Medicine  Pending sale to [facility]

## 2022-12-02 NOTE — ASSESSMENT & PLAN NOTE
Sleep Apnea:    The pathophysiology of sleep anea and the increased risk of cardiovascular morbidity from untreated sleep apnea is discussed in detail with the patient.  Urged to avoid supine sleep, weight gain and alcoholic beverages since all of these can worsen sleep apnea. Cautioned against drowsy driving. If feeling sleepy while driving, pull over for a break/nap, rather than persist on the road, in the interest of own safety and that of others on the road.    HSS was reviewed and discussed with the patient.     - Order placed for new CPAP 6-16 CWP  - I would like to do an OPO on CPAP to ensure adequate oxygen saturations, since his study did show that he had moderate to severe nocturnal desaturationst.  - Called the manager at [facility], who stated that the patient should be able to be set up on a new CPAP within 10-14 days as long as insurance approves it and although proper documentation is submitted  - Advised patient that he needs to follow-up with a new provider down in [place].  Provided patient with follow-up parameters.  - Compliance was reinforced  - Advised patient to reach out via [email or web address] if any questions or concerns should arise.   - Equipment replacement schedule:  Mask cushion every month  Nasal pillows 2 times per month  Mask every 6 months  Head gear every 6 months  Tubing every 3 months  Ultra-fine filters 2 times per month  Foam filter every 6 months  Humidifier chamber every 6 months  Chin strap every 6 months    Has been advised to continue the current CPAP, clean equipment frequently, and get new mask and supplies as allowed by insurance and DME. Recommend an earlier appointment, if significant treatment barriers develop.    The risks of untreated sleep apnea were discussed with the patient at length. Patients with LEORA are at increased risk of cardiovascular disease including coronary artery disease, systemic arterial hypertension, pulmonary arterial hypertension, cardiac arrythmias, and  stroke. The patient was advised to avoid driving a motor vehicle when drowsy.    Positive airway pressure will favorably impact many of the adverse conditions and effects provoked by LEORA.

## 2022-12-13 ENCOUNTER — HOSPITAL ENCOUNTER (OUTPATIENT)
Dept: LAB | Facility: MEDICAL CENTER | Age: 52
End: 2022-12-13
Payer: COMMERCIAL

## 2022-12-13 LAB
ALBUMIN SERPL BCP-MCNC: 4.7 G/DL (ref 3.2–4.9)
ALBUMIN/GLOB SERPL: 1.7 G/DL
ALP SERPL-CCNC: 93 U/L (ref 30–99)
ALT SERPL-CCNC: 76 U/L (ref 2–50)
ANION GAP SERPL CALC-SCNC: 15 MMOL/L (ref 7–16)
AST SERPL-CCNC: 36 U/L (ref 12–45)
BASOPHILS # BLD AUTO: 1 % (ref 0–1.8)
BASOPHILS # BLD: 0.05 K/UL (ref 0–0.12)
BILIRUB SERPL-MCNC: 0.7 MG/DL (ref 0.1–1.5)
BUN SERPL-MCNC: 14 MG/DL (ref 8–22)
CALCIUM ALBUM COR SERPL-MCNC: 9.4 MG/DL (ref 8.5–10.5)
CALCIUM SERPL-MCNC: 10 MG/DL (ref 8.5–10.5)
CHLORIDE SERPL-SCNC: 97 MMOL/L (ref 96–112)
CO2 SERPL-SCNC: 25 MMOL/L (ref 20–33)
CREAT SERPL-MCNC: 0.92 MG/DL (ref 0.5–1.4)
EOSINOPHIL # BLD AUTO: 0.16 K/UL (ref 0–0.51)
EOSINOPHIL NFR BLD: 3.2 % (ref 0–6.9)
ERYTHROCYTE [DISTWIDTH] IN BLOOD BY AUTOMATED COUNT: 38.8 FL (ref 35.9–50)
FERRITIN SERPL-MCNC: 221 NG/ML (ref 22–322)
GFR SERPLBLD CREATININE-BSD FMLA CKD-EPI: 101 ML/MIN/1.73 M 2
GLOBULIN SER CALC-MCNC: 2.8 G/DL (ref 1.9–3.5)
GLUCOSE SERPL-MCNC: 327 MG/DL (ref 65–99)
HBV CORE AB SERPL QL IA: NONREACTIVE
HBV SURFACE AB SERPL IA-ACNC: <3.5 MIU/ML (ref 0–10)
HBV SURFACE AG SER QL: NORMAL
HCT VFR BLD AUTO: 48.9 % (ref 42–52)
HCV AB SER QL: NORMAL
HGB BLD-MCNC: 16.7 G/DL (ref 14–18)
IMM GRANULOCYTES # BLD AUTO: 0.02 K/UL (ref 0–0.11)
IMM GRANULOCYTES NFR BLD AUTO: 0.4 % (ref 0–0.9)
INR PPP: 0.96 (ref 0.87–1.13)
IRON SATN MFR SERPL: 38 % (ref 15–55)
IRON SERPL-MCNC: 134 UG/DL (ref 50–180)
LYMPHOCYTES # BLD AUTO: 2.56 K/UL (ref 1–4.8)
LYMPHOCYTES NFR BLD: 51.3 % (ref 22–41)
MCH RBC QN AUTO: 27 PG (ref 27–33)
MCHC RBC AUTO-ENTMCNC: 34.2 G/DL (ref 33.7–35.3)
MCV RBC AUTO: 79 FL (ref 81.4–97.8)
MONOCYTES # BLD AUTO: 0.57 K/UL (ref 0–0.85)
MONOCYTES NFR BLD AUTO: 11.4 % (ref 0–13.4)
NEUTROPHILS # BLD AUTO: 1.63 K/UL (ref 1.82–7.42)
NEUTROPHILS NFR BLD: 32.7 % (ref 44–72)
NRBC # BLD AUTO: 0 K/UL
NRBC BLD-RTO: 0 /100 WBC
PLATELET # BLD AUTO: 145 K/UL (ref 164–446)
PMV BLD AUTO: 12.1 FL (ref 9–12.9)
POTASSIUM SERPL-SCNC: 3.8 MMOL/L (ref 3.6–5.5)
PROT SERPL-MCNC: 7.5 G/DL (ref 6–8.2)
PROTHROMBIN TIME: 12.7 SEC (ref 12–14.6)
RBC # BLD AUTO: 6.19 M/UL (ref 4.7–6.1)
SODIUM SERPL-SCNC: 137 MMOL/L (ref 135–145)
TIBC SERPL-MCNC: 355 UG/DL (ref 250–450)
TSH SERPL DL<=0.005 MIU/L-ACNC: 1.61 UIU/ML (ref 0.38–5.33)
UIBC SERPL-MCNC: 221 UG/DL (ref 110–370)
WBC # BLD AUTO: 5 K/UL (ref 4.8–10.8)

## 2022-12-13 PROCEDURE — 83010 ASSAY OF HAPTOGLOBIN QUANT: CPT

## 2022-12-13 PROCEDURE — 82465 ASSAY BLD/SERUM CHOLESTEROL: CPT

## 2022-12-13 PROCEDURE — 36415 COLL VENOUS BLD VENIPUNCTURE: CPT

## 2022-12-13 PROCEDURE — 82172 ASSAY OF APOLIPOPROTEIN: CPT

## 2022-12-13 PROCEDURE — 82977 ASSAY OF GGT: CPT

## 2022-12-13 PROCEDURE — 86038 ANTINUCLEAR ANTIBODIES: CPT

## 2022-12-13 PROCEDURE — 85610 PROTHROMBIN TIME: CPT

## 2022-12-13 PROCEDURE — 86704 HEP B CORE ANTIBODY TOTAL: CPT

## 2022-12-13 PROCEDURE — 83540 ASSAY OF IRON: CPT

## 2022-12-13 PROCEDURE — 86381 MITOCHONDRIAL ANTIBODY EACH: CPT

## 2022-12-13 PROCEDURE — 84460 ALANINE AMINO (ALT) (SGPT): CPT

## 2022-12-13 PROCEDURE — 83550 IRON BINDING TEST: CPT

## 2022-12-13 PROCEDURE — 87340 HEPATITIS B SURFACE AG IA: CPT

## 2022-12-13 PROCEDURE — 84478 ASSAY OF TRIGLYCERIDES: CPT

## 2022-12-13 PROCEDURE — 86708 HEPATITIS A ANTIBODY: CPT

## 2022-12-13 PROCEDURE — 82390 ASSAY OF CERULOPLASMIN: CPT

## 2022-12-13 PROCEDURE — 86706 HEP B SURFACE ANTIBODY: CPT

## 2022-12-13 PROCEDURE — 85025 COMPLETE CBC W/AUTO DIFF WBC: CPT

## 2022-12-13 PROCEDURE — 86803 HEPATITIS C AB TEST: CPT

## 2022-12-13 PROCEDURE — 84450 TRANSFERASE (AST) (SGOT): CPT

## 2022-12-13 PROCEDURE — 83883 ASSAY NEPHELOMETRY NOT SPEC: CPT

## 2022-12-13 PROCEDURE — 82947 ASSAY GLUCOSE BLOOD QUANT: CPT

## 2022-12-13 PROCEDURE — 86015 ACTIN ANTIBODY EACH: CPT

## 2022-12-13 PROCEDURE — 80053 COMPREHEN METABOLIC PANEL: CPT

## 2022-12-13 PROCEDURE — 84443 ASSAY THYROID STIM HORMONE: CPT

## 2022-12-13 PROCEDURE — 82247 BILIRUBIN TOTAL: CPT

## 2022-12-13 PROCEDURE — 82105 ALPHA-FETOPROTEIN SERUM: CPT

## 2022-12-13 PROCEDURE — 82728 ASSAY OF FERRITIN: CPT

## 2022-12-13 PROCEDURE — 82103 ALPHA-1-ANTITRYPSIN TOTAL: CPT

## 2022-12-15 LAB
A1AT SERPL-MCNC: 103 MG/DL (ref 90–200)
AFP-TM SERPL-MCNC: 3 NG/ML (ref 0–9)
HAV AB SER QL IA: NEGATIVE
MITOCHONDRIA M2 IGG SER-ACNC: 3 UNITS (ref 0–24.9)
NUCLEAR IGG SER QL IA: NORMAL
SMA IGG SER-ACNC: 7 UNITS (ref 0–19)

## 2022-12-16 LAB — CERULOPLASMIN SERPL-MCNC: 18 MG/DL (ref 15–30)

## 2022-12-19 LAB
A2 MACROGLOB SERPL-MCNC: 115 MG/DL (ref 110–276)
ALT SERPL W P-5'-P-CCNC: 83 IU/L (ref 0–55)
APO A-I SERPL-MCNC: 186 MG/DL (ref 101–178)
AST SERPL W P-5'-P-CCNC: 36 IU/L (ref 0–40)
BILIRUB SERPL-MCNC: 0.7 MG/DL (ref 0–1.2)
CHOLEST SERPL-MCNC: 231 MG/DL (ref 100–199)
FIBROSIS SCORING 550107: ABNORMAL
FIBROSIS STAGE SERPL QL: ABNORMAL
GGT SERPL-CCNC: 90 IU/L (ref 0–65)
GLUCOSE SERPL-MCNC: 332 MG/DL (ref 70–99)
HAPTOGLOB SERPL-MCNC: 73 MG/DL (ref 23–355)
INTERPRETATIONS: Z4787: ABNORMAL
LABORATORY COMMENT REPORT: ABNORMAL
LIVER FIBR SCORE SERPL CALC.FIBROSURE: 0.15 (ref 0–0.21)
NASH SCORING Z4789: ABNORMAL
NECROINFLAMMATORY ACT GRADE SERPL QL: ABNORMAL
NECROINFLAMMATORY ACT SCORE SERPL: 0.75
SERVICE CMNT-IMP: ABNORMAL
STEATOSIS GRADE Z4778: ABNORMAL
STEATOSIS GRADING Z4788: ABNORMAL
STEATOSIS SCORE Z4777: 0.85 (ref 0–0.3)
TRIGL SERPL-MCNC: 175 MG/DL (ref 0–149)

## 2023-05-22 NOTE — PROGRESS NOTES
Subjective:     CC:   Annual physical     HPI:   Junior Massey is a 50 y.o. male who presents for annual exam.  Tells me that overall he is physically feeling well.  Continues to work at [employer].  Sleeping well at night.  Denies depression or anxiety.    Last Colorectal Cancer Screening: 10 yrs ago b/c of bloody stools  Last Tdap: 4-5 yrs ago in [place], [place]    Exercise: moderate regular exercise, aerobic < 3 days a week  Diet: trying to improve this.  Drinks 6-12 beers a few nights per week.      He  has a past medical history of Asthma and Hypertension.  He  has a past surgical history that includes ankle arthroscopy and shoulder arthroscopy.    Family History   Problem Relation Age of Onset   • Cancer Mother         liver   • Diabetes Mother    • Hypertension Mother    • Hypertension Father    • Stroke Sister         49 - one sister   • Stroke Brother         51 - one brother   • Heart Disease Maternal Grandmother         MI     Social History     Tobacco Use   • Smoking status: Never Smoker   • Smokeless tobacco: Never Used   Substance Use Topics   • Alcohol use: Yes     Alcohol/week: 50.4 oz     Types: 84 Cans of beer per week   • Drug use: No     He  has no history on file for sexual activity.    Patient Active Problem List    Diagnosis Date Noted   • Varicocele 01/28/2020   • Hydrocele of testis 01/28/2020   • Cyst of epididymis 01/28/2020   • Essential hypertension 06/24/2019   • Mild intermittent asthma without complication 06/24/2019   • Iron overload 06/24/2019     Current Outpatient Medications   Medication Sig Dispense Refill   • omeprazole (PRILOSEC) 20 MG delayed-release capsule Take 1 Cap by mouth every day. 90 Cap 3   • albuterol 108 (90 Base) MCG/ACT Aero Soln inhalation aerosol Inhale 2 Puffs by mouth every 6 hours as needed for Shortness of Breath. 8.5 g 3   • triamterene/hctz (MAXZIDE-25/DYAZIDE) 37.5-25 MG Cap Take 1 Cap by mouth every morning. 90 Cap 3   • lisinopril (PRINIVIL) 20 MG Tab Take 1  Tab by mouth every day. 90 Tab 3     No current facility-administered medications for this visit.      Allergies   Allergen Reactions   • Asa [Aspirin]    • Shellfish Allergy        Review of Systems   Constitutional: Negative for fever, chills and malaise/fatigue.   HENT: Negative for congestion.    Eyes: Negative for pain.   Respiratory: Negative for cough and shortness of breath.    Cardiovascular: Negative for chest pain and leg swelling.   Gastrointestinal: Negative for nausea, vomiting, abdominal pain and diarrhea.   Genitourinary: Negative for dysuria and hematuria.   Skin: Negative for rash.   Neurological: Negative for dizziness, focal weakness and headaches.   Endo/Heme/Allergies: Does not bruise/bleed easily.   Psychiatric/Behavioral: Negative for depression.  The patient is not nervous/anxious.      Objective:     Vitals:    07/06/20 0909   BP: 110/78   Pulse: 80   Resp: 14   Temp: 37.1 °C (98.8 °F)   SpO2: 97%       Physical Exam:  Constitutional: Well-developed and well-nourished. Not diaphoretic. No distress.   Skin: Skin is warm and dry. No rash noted.  Head: Atraumatic without lesions.  Eyes: Conjunctivae and extraocular motions are normal. Pupils are equal, round, and reactive to light. No scleral icterus.   Ears:  External ears unremarkable. Tympanic membranes clear and intact.  Nose: Nares patent. Septum midline. Turbinates without erythema nor edema. No discharge.   Mouth/Throat: Tongue normal. Oropharynx is clear and moist. Posterior pharynx without erythema or exudates.  Neck: Supple, trachea midline. Normal range of motion. No thyromegaly present. No lymphadenopathy--cervical or supraclavicular.  Cardiovascular: Regular rate and rhythm, S1 and S2 without murmur, rubs, or gallops.    Abdomen: Soft, non tender, and without distention. Active bowel sounds in all four quadrants. No rebound, guarding, masses or HSM.  Extremities: No cyanosis, clubbing, erythema, nor edema. Distal pulses intact and  symmetric.   Musculoskeletal: All major joints AROM full in all directions without pain.  Neurological: Alert and oriented x 3. Grossly non-focal. Strength and sensation grossly intact. DTRs 2+/3 and symmetric.   Psychiatric:  Behavior, mood, and affect are appropriate.      Assessment and Plan:     1. Annual physical exam  - Comp Metabolic Panel; Future  - CBC WITH DIFFERENTIAL; Future  - Lipid Profile; Future  - HEMOGLOBIN A1C; Future    He is also followed by urology and pending varicocele surgery.  Colonoscopy will be due when he turns 52.  Blood pressure is very well controlled.  I encouraged him to cut back on his alcohol intake.  He will email me records of his last Tdap from [place] about 5 years ago.  Declined pneumonia vaccines.  Patient counseled about skin care, diet, supplements, and exercise.  Discussed diet and exercise, colorectal cancer screening, STD prevention and prostate cancer screening     Follow-up: Yearly     Home

## 2023-11-14 ENCOUNTER — TELEPHONE (OUTPATIENT)
Dept: SLEEP MEDICINE | Facility: MEDICAL CENTER | Age: 53
End: 2023-11-14
Payer: COMMERCIAL

## 2023-11-14 NOTE — TELEPHONE ENCOUNTER
VOICEMAIL: 11/13/23  1. Caller Name: Crissy                      Call Back Number: [phone]  fax# [phone]    2. Message: Crissy called and lm, requesting recent chart notes.     3. Patient approves office to leave a detailed voicemail/MyChart message: N\A    11/14/23:  Most recent OV note faxed to Crissy.